# Patient Record
Sex: FEMALE | Race: BLACK OR AFRICAN AMERICAN | NOT HISPANIC OR LATINO | Employment: FULL TIME | URBAN - METROPOLITAN AREA
[De-identification: names, ages, dates, MRNs, and addresses within clinical notes are randomized per-mention and may not be internally consistent; named-entity substitution may affect disease eponyms.]

---

## 2017-02-13 ENCOUNTER — HOSPITAL ENCOUNTER (EMERGENCY)
Facility: MEDICAL CENTER | Age: 27
End: 2017-02-13
Attending: EMERGENCY MEDICINE
Payer: MEDICAID

## 2017-02-13 ENCOUNTER — APPOINTMENT (OUTPATIENT)
Dept: RADIOLOGY | Facility: MEDICAL CENTER | Age: 27
End: 2017-02-13
Attending: EMERGENCY MEDICINE
Payer: MEDICAID

## 2017-02-13 VITALS
BODY MASS INDEX: 26.1 KG/M2 | WEIGHT: 192.68 LBS | OXYGEN SATURATION: 98 % | HEART RATE: 67 BPM | HEIGHT: 72 IN | SYSTOLIC BLOOD PRESSURE: 123 MMHG | RESPIRATION RATE: 18 BRPM | DIASTOLIC BLOOD PRESSURE: 76 MMHG | TEMPERATURE: 98.4 F

## 2017-02-13 DIAGNOSIS — F41.8 SITUATIONAL ANXIETY: ICD-10-CM

## 2017-02-13 DIAGNOSIS — G89.29 CHRONIC ABDOMINAL PAIN: ICD-10-CM

## 2017-02-13 DIAGNOSIS — R10.31 RIGHT LOWER QUADRANT ABDOMINAL PAIN: ICD-10-CM

## 2017-02-13 DIAGNOSIS — N83.201 CYST OF RIGHT OVARY: ICD-10-CM

## 2017-02-13 DIAGNOSIS — R10.9 CHRONIC ABDOMINAL PAIN: ICD-10-CM

## 2017-02-13 LAB
ALBUMIN SERPL BCP-MCNC: 4.1 G/DL (ref 3.2–4.9)
ALBUMIN/GLOB SERPL: 1.8 G/DL
ALP SERPL-CCNC: 56 U/L (ref 30–99)
ALT SERPL-CCNC: 13 U/L (ref 2–50)
ANION GAP SERPL CALC-SCNC: 7 MMOL/L (ref 0–11.9)
APPEARANCE UR: ABNORMAL
AST SERPL-CCNC: 15 U/L (ref 12–45)
BACTERIA #/AREA URNS HPF: ABNORMAL /HPF
BILIRUB SERPL-MCNC: 0.5 MG/DL (ref 0.1–1.5)
BILIRUB UR QL STRIP.AUTO: NEGATIVE
BUN SERPL-MCNC: 17 MG/DL (ref 8–22)
CALCIUM SERPL-MCNC: 9.1 MG/DL (ref 8.4–10.2)
CHLORIDE SERPL-SCNC: 106 MMOL/L (ref 96–112)
CO2 SERPL-SCNC: 26 MMOL/L (ref 20–33)
COLOR UR: YELLOW
CREAT SERPL-MCNC: 0.86 MG/DL (ref 0.5–1.4)
CULTURE IF INDICATED INDCX: YES UA CULTURE
EPI CELLS #/AREA URNS HPF: ABNORMAL /HPF
ERYTHROCYTE [DISTWIDTH] IN BLOOD BY AUTOMATED COUNT: 42.7 FL (ref 35.9–50)
GFR SERPL CREATININE-BSD FRML MDRD: >60 ML/MIN/1.73 M 2
GLOBULIN SER CALC-MCNC: 2.3 G/DL (ref 1.9–3.5)
GLUCOSE SERPL-MCNC: 88 MG/DL (ref 65–99)
GLUCOSE UR STRIP.AUTO-MCNC: NEGATIVE MG/DL
HCG UR QL: NEGATIVE
HCT VFR BLD AUTO: 41.6 % (ref 37–47)
HGB BLD-MCNC: 13.6 G/DL (ref 12–16)
KETONES UR STRIP.AUTO-MCNC: 15 MG/DL
LEUKOCYTE ESTERASE UR QL STRIP.AUTO: NEGATIVE
MCH RBC QN AUTO: 28.3 PG (ref 27–33)
MCHC RBC AUTO-ENTMCNC: 32.7 G/DL (ref 33.6–35)
MCV RBC AUTO: 86.5 FL (ref 81.4–97.8)
MICRO URNS: ABNORMAL
MUCOUS THREADS #/AREA URNS HPF: ABNORMAL /HPF
NITRITE UR QL STRIP.AUTO: NEGATIVE
PH UR STRIP.AUTO: 6 [PH]
PLATELET # BLD AUTO: 220 K/UL (ref 164–446)
PMV BLD AUTO: 10.7 FL (ref 9–12.9)
POTASSIUM SERPL-SCNC: 4 MMOL/L (ref 3.6–5.5)
PROT SERPL-MCNC: 6.4 G/DL (ref 6–8.2)
PROT UR QL STRIP: NEGATIVE MG/DL
RBC # BLD AUTO: 4.81 M/UL (ref 4.2–5.4)
RBC # URNS HPF: ABNORMAL /HPF
RBC UR QL AUTO: NEGATIVE
SODIUM SERPL-SCNC: 139 MMOL/L (ref 135–145)
SP GR UR REFRACTOMETRY: 1.03
SP GR UR REFRACTOMETRY: 1.03
WBC # BLD AUTO: 6.8 K/UL (ref 4.8–10.8)
WBC #/AREA URNS HPF: ABNORMAL /HPF

## 2017-02-13 PROCEDURE — 36415 COLL VENOUS BLD VENIPUNCTURE: CPT

## 2017-02-13 PROCEDURE — 99284 EMERGENCY DEPT VISIT MOD MDM: CPT

## 2017-02-13 PROCEDURE — 700117 HCHG RX CONTRAST REV CODE 255: Performed by: EMERGENCY MEDICINE

## 2017-02-13 PROCEDURE — 81001 URINALYSIS AUTO W/SCOPE: CPT

## 2017-02-13 PROCEDURE — 700111 HCHG RX REV CODE 636 W/ 250 OVERRIDE (IP): Performed by: EMERGENCY MEDICINE

## 2017-02-13 PROCEDURE — A9270 NON-COVERED ITEM OR SERVICE: HCPCS | Performed by: EMERGENCY MEDICINE

## 2017-02-13 PROCEDURE — 85027 COMPLETE CBC AUTOMATED: CPT

## 2017-02-13 PROCEDURE — 87086 URINE CULTURE/COLONY COUNT: CPT

## 2017-02-13 PROCEDURE — 74177 CT ABD & PELVIS W/CONTRAST: CPT

## 2017-02-13 PROCEDURE — 81025 URINE PREGNANCY TEST: CPT

## 2017-02-13 PROCEDURE — 80053 COMPREHEN METABOLIC PANEL: CPT

## 2017-02-13 PROCEDURE — 700102 HCHG RX REV CODE 250 W/ 637 OVERRIDE(OP): Performed by: EMERGENCY MEDICINE

## 2017-02-13 RX ORDER — NITROFURANTOIN 25; 75 MG/1; MG/1
100 CAPSULE ORAL 2 TIMES DAILY
Qty: 20 CAP | Refills: 0 | Status: SHIPPED | OUTPATIENT
Start: 2017-02-13 | End: 2017-02-20

## 2017-02-13 RX ORDER — OXYCODONE HYDROCHLORIDE AND ACETAMINOPHEN 5; 325 MG/1; MG/1
1-2 TABLET ORAL EVERY 6 HOURS PRN
Qty: 12 TAB | Refills: 0 | Status: ON HOLD | OUTPATIENT
Start: 2017-02-13 | End: 2018-05-17

## 2017-02-13 RX ORDER — ONDANSETRON 4 MG/1
4 TABLET, ORALLY DISINTEGRATING ORAL ONCE
Status: COMPLETED | OUTPATIENT
Start: 2017-02-13 | End: 2017-02-13

## 2017-02-13 RX ORDER — OXYCODONE HYDROCHLORIDE AND ACETAMINOPHEN 5; 325 MG/1; MG/1
1 TABLET ORAL ONCE
Status: COMPLETED | OUTPATIENT
Start: 2017-02-13 | End: 2017-02-13

## 2017-02-13 RX ADMIN — OXYCODONE HYDROCHLORIDE AND ACETAMINOPHEN 1 TABLET: 5; 325 TABLET ORAL at 22:18

## 2017-02-13 RX ADMIN — ONDANSETRON 4 MG: 4 TABLET, ORALLY DISINTEGRATING ORAL at 22:18

## 2017-02-13 RX ADMIN — IOHEXOL 100 ML: 350 INJECTION, SOLUTION INTRAVENOUS at 21:59

## 2017-02-13 ASSESSMENT — PAIN SCALES - GENERAL
PAINLEVEL_OUTOF10: 6
PAINLEVEL_OUTOF10: 7

## 2017-02-13 NOTE — ED AVS SNAPSHOT
2/13/2017          Jewell Chapman  [address]  [place] [address]    Dear Jewell:    [facility] wants to ensure your discharge home is safe and you or your loved ones have had all your questions answered regarding your care after you leave the hospital.    You may receive a telephone call within two days of your discharge.  This call is to make certain you understand your discharge instructions as well as ensure we provided you with the best care possible during your stay with us.     The call will only last approximately 3-5 minutes and will be done by a nurse.    Once again, we want to ensure your discharge home is safe and that you have a clear understanding of any next steps in your care.  If you have any questions or concerns, please do not hesitate to contact us, we are here for you.  Thank you for choosing [facility] for your healthcare needs.    Sincerely,    Jay Jo    [facility]

## 2017-02-13 NOTE — ED AVS SNAPSHOT
Boni Access Code: [email or web address]  Expires: 3/15/2017 10:48 PM    Boni  A secure, online tool to manage your health information     [employer] Boni® is a secure, online tool that connects you to your personalized health information from the privacy of your home -- day or night - making it very easy for you to manage your healthcare. Once the activation process is completed, you can even access your medical information using the Boni susi, which is available for free in the Apple Susi store or Google Play store.     Boni provides the following levels of access (as shown below):   My Chart Features   [facility] Primary Care Doctor [facility]  Specialists [facility]  Urgent  Care Non-[facility]  Primary Care  Doctor   Email your healthcare team securely and privately 24/7 X X X X   Manage appointments: schedule your next appointment; view details of past/upcoming appointments X      Request prescription refills. X      View recent personal medical records, including lab and immunizations X X X X   View health record, including health history, allergies, medications X X X X   Read reports about your outpatient visits, procedures, consult and ER notes X X X X   See your discharge summary, which is a recap of your hospital and/or ER visit that includes your diagnosis, lab results, and care plan. X X       How to register for Boni:  1. Go to  [email or web address].  2. Click on the Sign Up Now box, which takes you to the New Member Sign Up page. You will need to provide the following information:  a. Enter your Boni Access Code exactly as it appears at the top of this page. (You will not need to use this code after you’ve completed the sign-up process. If you do not sign up before the expiration date, you must request a new code.)   b. Enter your date of birth.   c. Enter your home email address.   d. Click Submit, and follow the next screen’s instructions.  3. Create a Boni ID. This will be your Boni  login ID and cannot be changed, so think of one that is secure and easy to remember.  4. Create a [employer] password. You can change your password at any time.  5. Enter your Password Reset Question and Answer. This can be used at a later time if you forget your password.   6. Enter your e-mail address. This allows you to receive e-mail notifications when new information is available in [employer].  7. Click Sign Up. You can now view your health information.    For assistance activating your [employer] account, call [phone]

## 2017-02-13 NOTE — ED AVS SNAPSHOT
Home Care Instructions                                                                                                                Jewell Chapman   MRN: [id number]    Department:  [facility], Emergency Dept   Date of Visit:  2/13/2017            [facility], Emergency Dept    [address]    [place] [address]    Phone:  [phone]      You were seen by     Joe Nava M.D.      Your Diagnosis Was     Right lower quadrant abdominal pain     R10.31       These are the medications you received during your hospitalization from 02/13/2017 1728 to 02/13/2017 2248     Date/Time Order Dose Route Action    02/13/2017 2159 iohexol (OMNIPAQUE) 350 mg/mL 100 mL Intravenous Given    02/13/2017 2218 oxycodone-acetaminophen (PERCOCET) 5-325 MG per tablet 1 Tab 1 Tab Oral Given    02/13/2017 2218 ondansetron (ZOFRAN ODT) dispertab 4 mg 4 mg Oral Given      Follow-up Information     1. Follow up with Your Physician. Schedule an appointment as soon as possible for a visit in 2 days.    Specialty:  Emergency Medicine    Contact information    Varies        Medication Information     Review all of your home medications and newly ordered medications with your primary doctor and/or pharmacist as soon as possible. Follow medication instructions as directed by your doctor and/or pharmacist.     Please keep your complete medication list with you and share with your physician. Update the information when medications are discontinued, doses are changed, or new medications (including over-the-counter products) are added; and carry medication information at all times in the event of emergency situations.               Medication List      START taking these medications        Instructions    nitrofurantoin monohydr macro 100 MG Caps   Commonly known as:  MACROBID    Take 1 Cap by mouth 2 times a day for 7 days.   Dose:  100 mg       oxycodone-acetaminophen 5-325 MG Tabs      Commonly known as:  PERCOCET    Take 1-2 Tabs by mouth every 6 hours as needed (pain).   Dose:  1-2 Tab         ASK your doctor about these medications        Instructions    albuterol 2.5 mg/0.5 mL Nebu   Commonly known as:  PROVENTIL    by Nebulization route ONCE (RT).               Procedures and tests performed during your visit     Procedure/Test Number of Times Performed    BETA-HCG QUALITATIVE URINE 1    CBC WITHOUT DIFFERENTIAL 1    COMP METABOLIC PANEL 1    CONSENT FOR CONTRAST INJECTION 1    CT-ABDOMEN-PELVIS WITH 1    ESTIMATED GFR 1    REFRACTOMETER SG 2    URINALYSIS,CULTURE IF INDICATED 1    URINE CULTURE(NEW) 1    URINE MICROSCOPIC (W/UA) 1        Discharge Instructions       Follow-up with her doctor.  ER for more pain, if you develop fever, vomiting or other concerns.  No driving on pain medicines.  Drink plenty of fluid.    Abdominal Pain, Adult  Many things can cause belly (abdominal) pain. Most times, the belly pain is not dangerous. Many cases of belly pain can be watched and treated at home.  HOME CARE   · Do not take medicines that help you go poop (laxatives) unless told to by your doctor.  · Only take medicine as told by your doctor.  · Eat or drink as told by your doctor. Your doctor will tell you if you should be on a special diet.  GET HELP IF:  · You do not know what is causing your belly pain.  · You have belly pain while you are sick to your stomach (nauseous) or have runny poop (diarrhea).  · You have pain while you pee or poop.  · Your belly pain wakes you up at night.  · You have belly pain that gets worse or better when you eat.  · You have belly pain that gets worse when you eat fatty foods.  · You have a fever.  GET HELP RIGHT AWAY IF:   · The pain does not go away within 2 hours.  · You keep throwing up (vomiting).  · The pain changes and is only in the right or left part of the belly.  · You have bloody or tarry looking poop.  MAKE SURE YOU:   · Understand these  "instructions.  · Will watch your condition.  · Will get help right away if you are not doing well or get worse.     This information is not intended to replace advice given to you by your health care provider. Make sure you discuss any questions you have with your health care provider.    Ovarian Cyst  An ovarian cyst is a fluid-filled sac that forms on an ovary. The ovaries are small organs that produce eggs in women. Various types of cysts can form on the ovaries. Most are not cancerous. Many do not cause problems, and they often go away on their own. Some may cause symptoms and require treatment. Common types of ovarian cysts include:  · Functional cysts--These cysts may occur every month during the menstrual cycle. This is normal. The cysts usually go away with the next menstrual cycle if the woman does not get pregnant. Usually, there are no symptoms with a functional cyst.  · Endometrioma cysts--These cysts form from the tissue that lines the uterus. They are also called \"chocolate cysts\" because they become filled with blood that turns brown. This type of cyst can cause pain in the lower abdomen during intercourse and with your menstrual period.  · Cystadenoma cysts--This type develops from the cells on the outside of the ovary. These cysts can get very big and cause lower abdomen pain and pain with intercourse. This type of cyst can twist on itself, cut off its blood supply, and cause severe pain. It can also easily rupture and cause a lot of pain.  · Dermoid cysts--This type of cyst is sometimes found in both ovaries. These cysts may contain different kinds of body tissue, such as skin, teeth, hair, or cartilage. They usually do not cause symptoms unless they get very big.  · Theca lutein cysts--These cysts occur when too much of a certain hormone (human chorionic gonadotropin) is produced and overstimulates the ovaries to produce an egg. This is most common after procedures used to assist with the " conception of a baby (in vitro fertilization).  CAUSES   · Fertility drugs can cause a condition in which multiple large cysts are formed on the ovaries. This is called ovarian hyperstimulation syndrome.  · A condition called polycystic ovary syndrome can cause hormonal imbalances that can lead to nonfunctional ovarian cysts.  SIGNS AND SYMPTOMS   Many ovarian cysts do not cause symptoms. If symptoms are present, they may include:  · Pelvic pain or pressure.  · Pain in the lower abdomen.  · Pain during sexual intercourse.  · Increasing girth (swelling) of the abdomen.  · Abnormal menstrual periods.  · Increasing pain with menstrual periods.  · Stopping having menstrual periods without being pregnant.  DIAGNOSIS   These cysts are commonly found during a routine or annual pelvic exam. Tests may be ordered to find out more about the cyst. These tests may include:  · Ultrasound.  · X-ray of the pelvis.  · CT scan.  · MRI.  · Blood tests.  TREATMENT   Many ovarian cysts go away on their own without treatment. Your health care provider may want to check your cyst regularly for 2-3 months to see if it changes. For women in menopause, it is particularly important to monitor a cyst closely because of the higher rate of ovarian cancer in menopausal women. When treatment is needed, it may include any of the following:  · A procedure to drain the cyst (aspiration). This may be done using a long needle and ultrasound. It can also be done through a laparoscopic procedure. This involves using a thin, lighted tube with a tiny camera on the end (laparoscope) inserted through a small incision.  · Surgery to remove the whole cyst. This may be done using laparoscopic surgery or an open surgery involving a larger incision in the lower abdomen.  · Hormone treatment or birth control pills. These methods are sometimes used to help dissolve a cyst.  HOME CARE INSTRUCTIONS   · Only take over-the-counter or prescription medicines as directed  by your health care provider.  · Follow up with your health care provider as directed.  · Get regular pelvic exams and Pap tests.  SEEK MEDICAL CARE IF:   · Your periods are late, irregular, or painful, or they stop.  · Your pelvic pain or abdominal pain does not go away.  · Your abdomen becomes larger or swollen.  · You have pressure on your bladder or trouble emptying your bladder completely.  · You have pain during sexual intercourse.  · You have feelings of fullness, pressure, or discomfort in your stomach.  · You lose weight for no apparent reason.  · You feel generally ill.  · You become constipated.  · You lose your appetite.  · You develop acne.  · You have an increase in body and facial hair.  · You are gaining weight, without changing your exercise and eating habits.  · You think you are pregnant.  SEEK IMMEDIATE MEDICAL CARE IF:   · You have increasing abdominal pain.  · You feel sick to your stomach (nauseous), and you throw up (vomit).  · You develop a fever that comes on suddenly.  · You have abdominal pain during a bowel movement.  · Your menstrual periods become heavier than usual.  MAKE SURE YOU:  · Understand these instructions.  · Will watch your condition.  · Will get help right away if you are not doing well or get worse.     This information is not intended to replace advice given to you by your health care provider. Make sure you discuss any questions you have with your health care provider.     Document Released: 12/18/2006 Document Revised: 12/23/2014 Document Reviewed: 08/25/2014  [employer] Patient Education ©2016 [employer]                Patient Information     Patient Information    Following emergency treatment: all patient requiring follow-up care must return either to a private physician or a clinic if your condition worsens before you are able to obtain further medical attention, please return to the emergency room.     Billing Information    At [employer], we work  to make the billing process streamlined for our patients.  Our Representatives are here to answer any questions you may have regarding your hospital bill.  If you have insurance coverage and have supplied your insurance information to us, we will submit a claim to your insurer on your behalf.  Should you have any questions regarding your bill, we can be reached online or by phone as follows:  Online: You are able pay your bills online or live chat with our representatives about any billing questions you may have. We are here to help Monday - Friday from 8:00am to 7:30pm and 9:00am - 12:00pm on Saturdays.  Please visit [email or web address]  for more information.   Phone:  [phone] or [phone]    Please note that your emergency physician, surgeon, pathologist, radiologist, anesthesiologist, and other specialists are not employed by [facility] and will therefore bill separately for their services.  Please contact them directly for any questions concerning their bills at the numbers below:     Emergency Physician Services:  [phone]  [facility]:  [phone]  [facility]:  [phone]  [facility]:  [phone]    1. Your final bill may vary from the amount quoted upon discharge if all procedures are not complete at that time, or if your doctor has additional procedures of which we are not aware. You will receive an additional bill if you return to the Emergency Department at [facility] for suture removal regardless of the facility of which the sutures were placed.     2. Please arrange for settlement of this account at the emergency registration.    3. All self-pay accounts are due in full at the time of treatment.  If you are unable to meet this obligation then payment is expected within 4-5 days.     4. If you have had radiology studies (CT, X-ray, Ultrasound, MRI), you have received a preliminary result during your  emergency department visit. Please contact the radiology department [phone] to receive a copy of your final result. Please discuss the Final result with your primary physician or with the follow up physician provided.     Crisis Hotline:  [place] Crisis Hotline:  7-533-EMJAUKO or 1-464.546.2438  [place] Crisis Hotline:    1-108.110.3588 or 310-664-7572         ED Discharge Follow Up Questions    1. In order to provide you with very good care, we would like to follow up with a phone call in the next few days.  May we have your permission to contact you?     YES /  NO    2. What is the best phone number to call you? (       )_____-__________    3. What is the best time to call you?      Morning  /  Afternoon  /  Evening                   Patient Signature:  ____________________________________________________________    Date:  ____________________________________________________________

## 2017-02-14 NOTE — ED PROVIDER NOTES
ED Provider Note    CHIEF COMPLAINT  Chief Complaint   Patient presents with   • RLQ Pain   • Urinary Pain       HPI  Jewell Chapman is a 26 y.o. female who presents to the ER complaining of right lower quadrant abdominal pain.  This patient presents for several months.  His worsened over the last week.  It is the right lower quadrant, does not radiate.  Worse in the morning when she 1st worked up and worsens when she has to pee.  It worsens when she denies.  She denies any increased frequency.  Denies possible pregnancy.  Patient had a hysterectomy.  She still has her right ovary does not have her left ovary.  Denies any abnormal vaginal bleeding, spotting or discharge.  No constipation or diarrhea.  Nothing makes it better, nothing makes it worse.  No other acute concerns or complaints.    REVIEW OF SYSTEMS  See HPI for further details. All other systems are negative.    PAST MEDICAL HISTORY  Past Medical History   Diagnosis Date   • Heartburn    • Hernia of unspecified site of abdominal cavity without mention of obstruction or gangrene    • Umbilical hernia Dx 5/2009   • Prenatal hydronephrosis in pregnancy, antepartum condition 4/5/2010   • Hernia of unspecified site of abdominal cavity without mention of obstruction or gangrene      abd   • Anxiety    • BV (bacterial vaginosis) 4/5/2010   • HPV (human papilloma virus) anogenital infection    • Pregnancy    • Endometriosis    • Hypoglycemia        FAMILY HISTORY  Family History   Problem Relation Age of Onset   • Alcohol/Drug Father      ETOH and Drug abuse   • Psychiatry Father      Schizophrenic, bipolar, depression   • Psychiatry Sister      none Dx'd   • Psychiatry Brother      none Dx'd   • Alcohol/Drug Maternal Grandmother      alcoholic   • Alcohol/Drug Paternal Grandmother      Drug abuse       SOCIAL HISTORY  Social History     Social History   • Marital Status: Single     Spouse Name: N/A   • Number of Children: N/A   • Years of Education: N/A      Social History Main Topics   • Smoking status: Current Every Day Smoker -- 0.50 packs/day for 9 years     Types: Cigarettes   • Smokeless tobacco: Never Used      Comment: 1/4 pack per day   • Alcohol Use: Yes      Comment: occasionally   • Drug Use: No   • Sexual Activity:     Partners: Male     Birth Control/ Protection: Pill     Other Topics Concern   • None     Social History Narrative       SURGICAL HISTORY  Past Surgical History   Procedure Laterality Date   • Other abdominal surgery       egd scope   • Pelviscopy  4/18/2013     Performed by Mal Horne M.D. at SURGERY SAME DAY [facility]   • Ovarian cystectomy  4/18/2013     Performed by Mal Horne M.D. at SURGERY SAME DAY [facility]   • Tubal coagulation laparoscopic bilateral  4/18/2013     Performed by Mal Horne M.D. at SURGERY SAME DAY [facility]   • Hysterectomy robotic         CURRENT MEDICATIONS  Home Medications     Reviewed by Ha Pak R.N. (Registered Nurse) on 02/13/17 at 1759  Med List Status: Complete    Medication Last Dose Status    albuterol (PROVENTIL) 2.5 mg/0.5 mL Nebu Soln 2/13/2017 Active                ALLERGIES  Allergies   Allergen Reactions   • Imitrex [Sumatriptan Succinate] Anaphylaxis   • Vicodin [Hydrocodone-Acetaminophen] Hives and Itching       PHYSICAL EXAM  VITAL SIGNS: /76 mmHg  Pulse 94  Temp(Src) 36.9 °C (98.4 °F)  Resp 18  Ht 1.829 m (6')  Wt 87.4 kg (192 lb 10.9 oz)  BMI 26.13 kg/m2  SpO2 98%  LMP 08/13/2009     Constitutional: Well developed, Well nourished, No acute distress, Non-toxic appearance.   HENT: Normocephalic, Atraumatic, Bilateral external ears normal, Oropharynx moist, No oral exudates, Nose normal.   Eyes: PERRL, EOMI, Conjunctiva normal, No discharge.   Neck: Normal range of motion, No tenderness, Supple, No stridor.   Lymphatic: No lymphadenopathy noted.   Cardiovascular: Normal heart rate, Normal rhythm, No murmurs, No rubs, No gallops.   Thorax & Lungs: Normal  breath sounds, No respiratory distress, No wheezing,   Abdomen: Bowel sounds normal, Soft, tenderness the right lower quadrant.  No peritonitis  Skin: Warm, Dry, No erythema, No rash.   Back: No tenderness, No CVA tenderness.   Musculoskeletal: Good range of motion in all major joints.   Neurologic: Alert & oriented x 3, No focal deficits noted.   Psychiatric: Affect normal,        RADIOLOGY/PROCEDURES  CT-ABDOMEN-PELVIS WITH   Final Result      1.  No acute abdominal or pelvic findings.   2.  Probable small right ovarian cyst.            COURSE & MEDICAL DECISION MAKING  Pertinent Labs & Imaging studies reviewed. (See chart for details)  Patient presents to the ER with right lower quadrant which is been going on for a long period of time.  Skin in and out of the ER for right lower quadrant pain on multiple occasions, has been worked up.  She's had some, located abdominal hernias and has required surgery in the past.  She denies any gynecologic complaints or urinary complaints.  She has no vaginal bleeding, spotting or discharge.  She denies nausea, vomiting, diarrhea.  Although she has some tenderness of the right lower quadrant.  She has no peritonitis, rebound.  She has no fever, leukocytosis or other laboratory abnormalities.  Her old chart is reviewed.  She's had multiple CAT scans in the past, multiple workups.  I don't see any evidence of appendicitis.  DT does show small ovarian cysts and this may be in fact causing her pain.  Is not large enough to justify doing an ultrasound.  The patient with her a few days of Macrobid is of urinary frequency and dysuria.    The patient's discomfort .  We'll prescribe her a short course of pain medicines.  She is referred back to her doctor.    Prior to the prescription of narcotics.  Her  is reviewed.    The patient is agreeable to plan.  She'll follow-up with her doctor.  She is discharged in good condition      FINAL IMPRESSION  1. Right lower quadrant abdominal  pain    2. Chronic abdominal pain    3. Situational anxiety        2.   3.         Electronically signed by: Joe Nava, 2/13/2017 9:37 PM

## 2017-02-14 NOTE — ED NOTES
Dc instructions and prescriptions given. Pt to f/u with pcp, return for worsening s/s, aware of not being able to drive due to meds recvd in er

## 2017-02-14 NOTE — DISCHARGE INSTRUCTIONS
Follow-up with her doctor.  ER for more pain, if you develop fever, vomiting or other concerns.  No driving on pain medicines.  Drink plenty of fluid.    Abdominal Pain, Adult  Many things can cause belly (abdominal) pain. Most times, the belly pain is not dangerous. Many cases of belly pain can be watched and treated at home.  HOME CARE   · Do not take medicines that help you go poop (laxatives) unless told to by your doctor.  · Only take medicine as told by your doctor.  · Eat or drink as told by your doctor. Your doctor will tell you if you should be on a special diet.  GET HELP IF:  · You do not know what is causing your belly pain.  · You have belly pain while you are sick to your stomach (nauseous) or have runny poop (diarrhea).  · You have pain while you pee or poop.  · Your belly pain wakes you up at night.  · You have belly pain that gets worse or better when you eat.  · You have belly pain that gets worse when you eat fatty foods.  · You have a fever.  GET HELP RIGHT AWAY IF:   · The pain does not go away within 2 hours.  · You keep throwing up (vomiting).  · The pain changes and is only in the right or left part of the belly.  · You have bloody or tarry looking poop.  MAKE SURE YOU:   · Understand these instructions.  · Will watch your condition.  · Will get help right away if you are not doing well or get worse.     This information is not intended to replace advice given to you by your health care provider. Make sure you discuss any questions you have with your health care provider.    Ovarian Cyst  An ovarian cyst is a fluid-filled sac that forms on an ovary. The ovaries are small organs that produce eggs in women. Various types of cysts can form on the ovaries. Most are not cancerous. Many do not cause problems, and they often go away on their own. Some may cause symptoms and require treatment. Common types of ovarian cysts include:  · Functional cysts--These cysts may occur every month during the  "menstrual cycle. This is normal. The cysts usually go away with the next menstrual cycle if the woman does not get pregnant. Usually, there are no symptoms with a functional cyst.  · Endometrioma cysts--These cysts form from the tissue that lines the uterus. They are also called \"chocolate cysts\" because they become filled with blood that turns brown. This type of cyst can cause pain in the lower abdomen during intercourse and with your menstrual period.  · Cystadenoma cysts--This type develops from the cells on the outside of the ovary. These cysts can get very big and cause lower abdomen pain and pain with intercourse. This type of cyst can twist on itself, cut off its blood supply, and cause severe pain. It can also easily rupture and cause a lot of pain.  · Dermoid cysts--This type of cyst is sometimes found in both ovaries. These cysts may contain different kinds of body tissue, such as skin, teeth, hair, or cartilage. They usually do not cause symptoms unless they get very big.  · Theca lutein cysts--These cysts occur when too much of a certain hormone (human chorionic gonadotropin) is produced and overstimulates the ovaries to produce an egg. This is most common after procedures used to assist with the conception of a baby (in vitro fertilization).  CAUSES   · Fertility drugs can cause a condition in which multiple large cysts are formed on the ovaries. This is called ovarian hyperstimulation syndrome.  · A condition called polycystic ovary syndrome can cause hormonal imbalances that can lead to nonfunctional ovarian cysts.  SIGNS AND SYMPTOMS   Many ovarian cysts do not cause symptoms. If symptoms are present, they may include:  · Pelvic pain or pressure.  · Pain in the lower abdomen.  · Pain during sexual intercourse.  · Increasing girth (swelling) of the abdomen.  · Abnormal menstrual periods.  · Increasing pain with menstrual periods.  · Stopping having menstrual periods without being pregnant.  DIAGNOSIS "   These cysts are commonly found during a routine or annual pelvic exam. Tests may be ordered to find out more about the cyst. These tests may include:  · Ultrasound.  · X-ray of the pelvis.  · CT scan.  · MRI.  · Blood tests.  TREATMENT   Many ovarian cysts go away on their own without treatment. Your health care provider may want to check your cyst regularly for 2-3 months to see if it changes. For women in menopause, it is particularly important to monitor a cyst closely because of the higher rate of ovarian cancer in menopausal women. When treatment is needed, it may include any of the following:  · A procedure to drain the cyst (aspiration). This may be done using a long needle and ultrasound. It can also be done through a laparoscopic procedure. This involves using a thin, lighted tube with a tiny camera on the end (laparoscope) inserted through a small incision.  · Surgery to remove the whole cyst. This may be done using laparoscopic surgery or an open surgery involving a larger incision in the lower abdomen.  · Hormone treatment or birth control pills. These methods are sometimes used to help dissolve a cyst.  HOME CARE INSTRUCTIONS   · Only take over-the-counter or prescription medicines as directed by your health care provider.  · Follow up with your health care provider as directed.  · Get regular pelvic exams and Pap tests.  SEEK MEDICAL CARE IF:   · Your periods are late, irregular, or painful, or they stop.  · Your pelvic pain or abdominal pain does not go away.  · Your abdomen becomes larger or swollen.  · You have pressure on your bladder or trouble emptying your bladder completely.  · You have pain during sexual intercourse.  · You have feelings of fullness, pressure, or discomfort in your stomach.  · You lose weight for no apparent reason.  · You feel generally ill.  · You become constipated.  · You lose your appetite.  · You develop acne.  · You have an increase in body and facial hair.  · You are  gaining weight, without changing your exercise and eating habits.  · You think you are pregnant.  SEEK IMMEDIATE MEDICAL CARE IF:   · You have increasing abdominal pain.  · You feel sick to your stomach (nauseous), and you throw up (vomit).  · You develop a fever that comes on suddenly.  · You have abdominal pain during a bowel movement.  · Your menstrual periods become heavier than usual.  MAKE SURE YOU:  · Understand these instructions.  · Will watch your condition.  · Will get help right away if you are not doing well or get worse.     This information is not intended to replace advice given to you by your health care provider. Make sure you discuss any questions you have with your health care provider.     Document Released: 12/18/2006 Document Revised: 12/23/2014 Document Reviewed: 08/25/2014  Elsevier Interactive Patient Education ©2016 [employer]

## 2017-02-15 LAB
BACTERIA UR CULT: NORMAL
SIGNIFICANT IND 70042: NORMAL
SITE SITE: NORMAL
SOURCE SOURCE: NORMAL

## 2017-05-30 ENCOUNTER — HOSPITAL ENCOUNTER (EMERGENCY)
Dept: HOSPITAL 8 - ED | Age: 27
Discharge: HOME | End: 2017-05-30
Payer: SELF-PAY

## 2017-05-30 VITALS — WEIGHT: 192.02 LBS | HEIGHT: 72 IN | BODY MASS INDEX: 26.01 KG/M2

## 2017-05-30 VITALS — DIASTOLIC BLOOD PRESSURE: 84 MMHG | SYSTOLIC BLOOD PRESSURE: 131 MMHG

## 2017-05-30 DIAGNOSIS — H01.005: Primary | ICD-10-CM

## 2017-05-30 DIAGNOSIS — K21.9: ICD-10-CM

## 2017-05-30 DIAGNOSIS — B95.8: ICD-10-CM

## 2017-05-30 DIAGNOSIS — J45.909: ICD-10-CM

## 2017-05-30 DIAGNOSIS — Z90.710: ICD-10-CM

## 2017-05-30 DIAGNOSIS — F17.210: ICD-10-CM

## 2017-05-30 DIAGNOSIS — Z88.8: ICD-10-CM

## 2017-05-30 PROCEDURE — 99283 EMERGENCY DEPT VISIT LOW MDM: CPT

## 2017-08-06 ENCOUNTER — HOSPITAL ENCOUNTER (EMERGENCY)
Dept: HOSPITAL 8 - ED | Age: 27
Discharge: HOME | End: 2017-08-06
Payer: COMMERCIAL

## 2017-08-06 VITALS — HEIGHT: 72 IN | BODY MASS INDEX: 26.67 KG/M2 | WEIGHT: 196.87 LBS

## 2017-08-06 VITALS — SYSTOLIC BLOOD PRESSURE: 122 MMHG | DIASTOLIC BLOOD PRESSURE: 64 MMHG

## 2017-08-06 DIAGNOSIS — M54.6: ICD-10-CM

## 2017-08-06 DIAGNOSIS — N28.9: Primary | ICD-10-CM

## 2017-08-06 LAB
AST SERPL-CCNC: 13 U/L (ref 15–37)
BUN SERPL-MCNC: 17 MG/DL (ref 7–18)
PATH.CAST-FLAG: (no result)
SPERM-FLAG: (no result)
SRC-FLAG: (no result)
XTAL-FLAG: (no result)
YLC-FLAG: (no result)

## 2017-08-06 PROCEDURE — 80053 COMPREHEN METABOLIC PANEL: CPT

## 2017-08-06 PROCEDURE — 85025 COMPLETE CBC W/AUTO DIFF WBC: CPT

## 2017-08-06 PROCEDURE — 93005 ELECTROCARDIOGRAM TRACING: CPT

## 2017-08-06 PROCEDURE — 84703 CHORIONIC GONADOTROPIN ASSAY: CPT

## 2017-08-06 PROCEDURE — 81001 URINALYSIS AUTO W/SCOPE: CPT

## 2017-08-06 PROCEDURE — 96374 THER/PROPH/DIAG INJ IV PUSH: CPT

## 2017-08-06 PROCEDURE — 36415 COLL VENOUS BLD VENIPUNCTURE: CPT

## 2017-08-06 PROCEDURE — 99285 EMERGENCY DEPT VISIT HI MDM: CPT

## 2017-08-06 PROCEDURE — 96361 HYDRATE IV INFUSION ADD-ON: CPT

## 2017-08-06 PROCEDURE — 87086 URINE CULTURE/COLONY COUNT: CPT

## 2017-09-11 ENCOUNTER — HOSPITAL ENCOUNTER (EMERGENCY)
Dept: HOSPITAL 8 - ED | Age: 27
Discharge: HOME | End: 2017-09-11
Payer: SELF-PAY

## 2017-09-11 VITALS — SYSTOLIC BLOOD PRESSURE: 133 MMHG | DIASTOLIC BLOOD PRESSURE: 77 MMHG

## 2017-09-11 VITALS — WEIGHT: 194.01 LBS | HEIGHT: 72 IN | BODY MASS INDEX: 26.28 KG/M2

## 2017-09-11 DIAGNOSIS — K21.9: ICD-10-CM

## 2017-09-11 DIAGNOSIS — H10.023: Primary | ICD-10-CM

## 2017-09-11 DIAGNOSIS — J45.909: ICD-10-CM

## 2017-09-11 DIAGNOSIS — Z90.710: ICD-10-CM

## 2017-09-11 PROCEDURE — 99283 EMERGENCY DEPT VISIT LOW MDM: CPT

## 2017-09-14 ENCOUNTER — HOSPITAL ENCOUNTER (EMERGENCY)
Dept: HOSPITAL 8 - ED | Age: 27
Discharge: HOME | End: 2017-09-14
Payer: COMMERCIAL

## 2017-09-14 VITALS — BODY MASS INDEX: 27.11 KG/M2 | HEIGHT: 72 IN | WEIGHT: 200.18 LBS

## 2017-09-14 VITALS — SYSTOLIC BLOOD PRESSURE: 136 MMHG | DIASTOLIC BLOOD PRESSURE: 83 MMHG
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
## 2017-09-14 DIAGNOSIS — Z88.6: ICD-10-CM

## 2017-09-14 DIAGNOSIS — Z88.8: ICD-10-CM

## 2017-09-14 DIAGNOSIS — M79.642: ICD-10-CM

## 2017-09-14 DIAGNOSIS — S60.052A: Primary | ICD-10-CM

## 2017-09-14 PROCEDURE — 99284 EMERGENCY DEPT VISIT MOD MDM: CPT

## 2017-09-14 PROCEDURE — 29130 APPL FINGER SPLINT STATIC: CPT

## 2017-10-08 ENCOUNTER — HOSPITAL ENCOUNTER (EMERGENCY)
Dept: HOSPITAL 8 - ED | Age: 27
Discharge: HOME | End: 2017-10-08
Payer: SELF-PAY

## 2017-10-08 VITALS — BODY MASS INDEX: 27.47 KG/M2 | HEIGHT: 72 IN | WEIGHT: 202.83 LBS

## 2017-10-08 VITALS — DIASTOLIC BLOOD PRESSURE: 85 MMHG | SYSTOLIC BLOOD PRESSURE: 142 MMHG

## 2017-10-08 DIAGNOSIS — K21.9: ICD-10-CM

## 2017-10-08 DIAGNOSIS — S80.02XA: Primary | ICD-10-CM
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
## 2017-10-08 DIAGNOSIS — F17.200: ICD-10-CM
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
## 2017-10-08 DIAGNOSIS — Z88.5: ICD-10-CM

## 2017-10-08 DIAGNOSIS — J45.909: ICD-10-CM

## 2017-10-08 DIAGNOSIS — N80.9: ICD-10-CM

## 2017-10-08 DIAGNOSIS — Z88.8: ICD-10-CM

## 2017-10-08 PROCEDURE — 99284 EMERGENCY DEPT VISIT MOD MDM: CPT

## 2018-01-31 ENCOUNTER — NON-PROVIDER VISIT (OUTPATIENT)
Dept: OCCUPATIONAL MEDICINE | Facility: CLINIC | Age: 28
End: 2018-01-31

## 2018-01-31 DIAGNOSIS — Z11.1 ENCOUNTER FOR PPD TEST: Primary | ICD-10-CM

## 2018-01-31 PROCEDURE — 86580 TB INTRADERMAL TEST: CPT | Performed by: PREVENTIVE MEDICINE

## 2018-02-01 ENCOUNTER — HOSPITAL ENCOUNTER (EMERGENCY)
Dept: HOSPITAL 8 - ED | Age: 28
Discharge: HOME | End: 2018-02-01
Payer: MEDICAID

## 2018-02-01 VITALS — DIASTOLIC BLOOD PRESSURE: 80 MMHG | SYSTOLIC BLOOD PRESSURE: 120 MMHG

## 2018-02-01 VITALS — BODY MASS INDEX: 27.83 KG/M2 | HEIGHT: 72 IN | WEIGHT: 205.47 LBS

## 2018-02-01 DIAGNOSIS — R07.89: Primary | ICD-10-CM

## 2018-02-01 DIAGNOSIS — M94.0: ICD-10-CM

## 2018-02-01 LAB
ALBUMIN SERPL-MCNC: 3.3 G/DL (ref 3.4–5)
ANION GAP SERPL CALC-SCNC: 7 MMOL/L (ref 5–15)
CALCIUM SERPL-MCNC: 8.2 MG/DL (ref 8.5–10.1)
CHLORIDE SERPL-SCNC: 111 MMOL/L (ref 98–107)
CREAT SERPL-MCNC: 0.76 MG/DL (ref 0.55–1.02)

## 2018-02-01 PROCEDURE — 80048 BASIC METABOLIC PNL TOTAL CA: CPT

## 2018-02-01 PROCEDURE — 82040 ASSAY OF SERUM ALBUMIN: CPT

## 2018-02-01 PROCEDURE — 99285 EMERGENCY DEPT VISIT HI MDM: CPT

## 2018-02-01 PROCEDURE — 36415 COLL VENOUS BLD VENIPUNCTURE: CPT

## 2018-02-01 PROCEDURE — 85379 FIBRIN DEGRADATION QUANT: CPT

## 2018-02-01 PROCEDURE — 93005 ELECTROCARDIOGRAM TRACING: CPT

## 2018-02-01 PROCEDURE — 96372 THER/PROPH/DIAG INJ SC/IM: CPT

## 2018-02-01 PROCEDURE — 71045 X-RAY EXAM CHEST 1 VIEW: CPT

## 2018-02-02 ENCOUNTER — NON-PROVIDER VISIT (OUTPATIENT)
Dept: OCCUPATIONAL MEDICINE | Facility: CLINIC | Age: 28
End: 2018-02-02

## 2018-02-02 DIAGNOSIS — Z11.1 ENCOUNTER FOR PPD SKIN TEST READING: ICD-10-CM

## 2018-02-02 LAB — TB WHEAL 3D P 5 TU DIAM: NORMAL MM

## 2018-05-11 NOTE — H&P
DATE OF SCHEDULED SURGERY:  2018    REASON FOR SURGERY:  Symptomatic pelvic floor relaxation, pelvic pain, type 2   stress urinary incontinence as demonstrated by urodynamic studies.    HISTORY OF PRESENT ILLNESS:  This is a 27-year-old  4, para 3,   spontaneous  1, status post previous hysterectomy for refractory   endometriosis and pelvic pain, now with pelvic floor relaxation with large   bulge at the vaginal introitus and stress urinary incontinence associated with   significant dyspareunia.  She states that she is wishing to proceed forward   with attempted definitive surgical correction with an anterior and posterior   vaginal repair, enterocele repair, perineoplasty, sacrospinous vault   suspension in addition to transobturator tape midurethral sling procedure.    Risks, benefits, alternatives of all individual portions of the surgery were   addressed with the patient in detail over several visits.  She has asked   appropriate questions, signed the appropriate consents and is wishing to   proceed forward with surgery as planned.  Of note, she does state that she   understands the limitations of surgery and addressing pelvic pain in addition   to dyspareunia, these symptoms may be improved or actually worsened with the   surgery as described above and she is interested in proceeding forward with   surgery nonetheless.  Given understanding the limitations of surgery.    PAST MEDICAL HISTORY:  As stated above, endometriosis, asthma,   nephrolithiasis, hernia.    PAST SURGICAL HISTORY:  Hysterectomy and pelvic floor repair in .    OBSTETRICAL HISTORY:  The patient had 3 previous deliveries, 1 spontaneous   .    GYNECOLOGIC HISTORY:  Patient has been amenorrheic since her previous surgery.    She does report a large bulge at the vaginal introitus, pelvic pain,   dyspareunia, stress urinary incontinence requiring intermittent pad therapy.    She denies any previous sexually  transmitted diseases or pelvic infection.    SOCIAL HISTORY:  She is single.  She denies the use of any alcohol.  She   reports a 10-year history of tobacco use, currently smoking a quarter of a   pack of cigarettes per day.  She denies any other drug use.    MEDICATIONS:  None.    ALLERGIES:  IMITREX AND VICODIN.    PHYSICAL EXAMINATION:  VITAL SIGNS:  She is afebrile, hemodynamically stable.  HEART:  Regular rate and rhythm.  CHEST:  Clear to auscultation bilaterally.  ABDOMEN:  Soft, nondistended, nontender.  Bowel sounds were present.  PELVIC:  Exam  shows normal external female genitalia.  Grade II anterior,   posterior and apical vaginal relaxation.  Bimanual exam shows no adnexal mass   or tenderness to palpation were appreciated.  EXTREMITIES:  Nontender.  Urodynamic studies did demonstrate and confirmed   type 2 stress urinary incontinence.  The patient did have a negative urine   pregnancy test.    ASSESSMENT AND PLAN:  A 27-year-old woman with symptomatic pelvic floor   relaxation, pelvic pain, dyspareunia, type 2 stress urinary incontinence, now   interested in proceeding forward with surgery as described above,   understanding limitations of surgery.  She has asked appropriate questions,   signed the appropriate consents, and is wishing to proceed forward with   surgery as planned.       ____________________________________     MD LORETTA KING / JEAN-PIERRE    DD:  05/11/2018 08:20:07  DT:  05/11/2018 08:44:44    D#:  [id number]  Job#:  [id number]

## 2018-05-17 ENCOUNTER — HOSPITAL ENCOUNTER (OUTPATIENT)
Facility: MEDICAL CENTER | Age: 28
End: 2018-05-17
Attending: SPECIALIST | Admitting: SPECIALIST
Payer: MEDICAID

## 2018-05-17 VITALS
BODY MASS INDEX: 29.47 KG/M2 | WEIGHT: 217.59 LBS | TEMPERATURE: 97.6 F | RESPIRATION RATE: 16 BRPM | DIASTOLIC BLOOD PRESSURE: 72 MMHG | HEART RATE: 70 BPM | SYSTOLIC BLOOD PRESSURE: 110 MMHG | HEIGHT: 72 IN | OXYGEN SATURATION: 96 %

## 2018-05-17 PROCEDURE — A9270 NON-COVERED ITEM OR SERVICE: HCPCS

## 2018-05-17 PROCEDURE — 700111 HCHG RX REV CODE 636 W/ 250 OVERRIDE (IP)

## 2018-05-17 PROCEDURE — 700101 HCHG RX REV CODE 250

## 2018-05-17 PROCEDURE — 160041 HCHG SURGERY MINUTES - EA ADDL 1 MIN LEVEL 4: Performed by: SPECIALIST

## 2018-05-17 PROCEDURE — 160036 HCHG PACU - EA ADDL 30 MINS PHASE I: Performed by: SPECIALIST

## 2018-05-17 PROCEDURE — 160002 HCHG RECOVERY MINUTES (STAT): Performed by: SPECIALIST

## 2018-05-17 PROCEDURE — A4338 INDWELLING CATHETER LATEX: HCPCS | Performed by: SPECIALIST

## 2018-05-17 PROCEDURE — 160009 HCHG ANES TIME/MIN: Performed by: SPECIALIST

## 2018-05-17 PROCEDURE — 501330 HCHG SET, CYSTO IRRIG TUBING: Performed by: SPECIALIST

## 2018-05-17 PROCEDURE — 700102 HCHG RX REV CODE 250 W/ 637 OVERRIDE(OP)

## 2018-05-17 PROCEDURE — 502240 HCHG MISC OR SUPPLY RC 0272: Performed by: SPECIALIST

## 2018-05-17 PROCEDURE — 160035 HCHG PACU - 1ST 60 MINS PHASE I: Performed by: SPECIALIST

## 2018-05-17 PROCEDURE — 500892 HCHG PACK, PERI-GYN: Performed by: SPECIALIST

## 2018-05-17 PROCEDURE — 501838 HCHG SUTURE GENERAL: Performed by: SPECIALIST

## 2018-05-17 PROCEDURE — C1771 REP DEV, URINARY, W/SLING: HCPCS | Performed by: SPECIALIST

## 2018-05-17 PROCEDURE — 160029 HCHG SURGERY MINUTES - 1ST 30 MINS LEVEL 4: Performed by: SPECIALIST

## 2018-05-17 PROCEDURE — 160048 HCHG OR STATISTICAL LEVEL 1-5: Performed by: SPECIALIST

## 2018-05-17 DEVICE — SLING TOT OBTRYX I HALO: Type: IMPLANTABLE DEVICE | Status: FUNCTIONAL

## 2018-05-17 RX ORDER — HALOPERIDOL 5 MG/ML
INJECTION INTRAMUSCULAR
Status: COMPLETED
Start: 2018-05-17 | End: 2018-05-17

## 2018-05-17 RX ORDER — ACETAMINOPHEN 500 MG
1000 TABLET ORAL EVERY 6 HOURS
Status: DISCONTINUED | OUTPATIENT
Start: 2018-05-17 | End: 2018-05-17 | Stop reason: HOSPADM

## 2018-05-17 RX ORDER — SIMETHICONE 80 MG
80 TABLET,CHEWABLE ORAL EVERY 8 HOURS PRN
Status: DISCONTINUED | OUTPATIENT
Start: 2018-05-17 | End: 2018-05-17 | Stop reason: HOSPADM

## 2018-05-17 RX ORDER — IBUPROFEN 800 MG/1
800 TABLET ORAL EVERY 8 HOURS PRN
COMMUNITY
End: 2018-06-06

## 2018-05-17 RX ORDER — BUPIVACAINE HYDROCHLORIDE AND EPINEPHRINE 2.5; 5 MG/ML; UG/ML
INJECTION, SOLUTION INFILTRATION; PERINEURAL
Status: DISCONTINUED | OUTPATIENT
Start: 2018-05-17 | End: 2018-05-17 | Stop reason: HOSPADM

## 2018-05-17 RX ORDER — MEPERIDINE HYDROCHLORIDE 25 MG/ML
INJECTION INTRAMUSCULAR; INTRAVENOUS; SUBCUTANEOUS
Status: COMPLETED
Start: 2018-05-17 | End: 2018-05-17

## 2018-05-17 RX ORDER — SODIUM CHLORIDE, SODIUM LACTATE, POTASSIUM CHLORIDE, CALCIUM CHLORIDE 600; 310; 30; 20 MG/100ML; MG/100ML; MG/100ML; MG/100ML
INJECTION, SOLUTION INTRAVENOUS CONTINUOUS
Status: DISCONTINUED | OUTPATIENT
Start: 2018-05-17 | End: 2018-05-17

## 2018-05-17 RX ORDER — METOCLOPRAMIDE HYDROCHLORIDE 5 MG/ML
10 INJECTION INTRAMUSCULAR; INTRAVENOUS EVERY 4 HOURS PRN
Status: DISCONTINUED | OUTPATIENT
Start: 2018-05-17 | End: 2018-05-17 | Stop reason: HOSPADM

## 2018-05-17 RX ORDER — BUPIVACAINE HYDROCHLORIDE 2.5 MG/ML
INJECTION, SOLUTION EPIDURAL; INFILTRATION; INTRACAUDAL
Status: DISCONTINUED
Start: 2018-05-17 | End: 2018-05-17 | Stop reason: HOSPADM

## 2018-05-17 RX ORDER — ONDANSETRON 2 MG/ML
4 INJECTION INTRAMUSCULAR; INTRAVENOUS EVERY 6 HOURS PRN
Status: DISCONTINUED | OUTPATIENT
Start: 2018-05-17 | End: 2018-05-17 | Stop reason: HOSPADM

## 2018-05-17 RX ORDER — MIDAZOLAM HYDROCHLORIDE 1 MG/ML
INJECTION INTRAMUSCULAR; INTRAVENOUS
Status: DISCONTINUED
Start: 2018-05-17 | End: 2018-05-17 | Stop reason: HOSPADM

## 2018-05-17 RX ORDER — OXYCODONE HYDROCHLORIDE 5 MG/1
10 TABLET ORAL
Status: DISCONTINUED | OUTPATIENT
Start: 2018-05-17 | End: 2018-05-17 | Stop reason: HOSPADM

## 2018-05-17 RX ORDER — OXYCODONE HYDROCHLORIDE AND ACETAMINOPHEN 5; 325 MG/1; MG/1
TABLET ORAL
Status: COMPLETED
Start: 2018-05-17 | End: 2018-05-17

## 2018-05-17 RX ADMIN — FENTANYL CITRATE 50 MCG: 50 INJECTION, SOLUTION INTRAMUSCULAR; INTRAVENOUS at 11:15

## 2018-05-17 RX ADMIN — FENTANYL CITRATE 50 MCG: 50 INJECTION, SOLUTION INTRAMUSCULAR; INTRAVENOUS at 11:31

## 2018-05-17 RX ADMIN — MEPERIDINE HYDROCHLORIDE 12.5 MG: 25 INJECTION INTRAMUSCULAR; INTRAVENOUS; SUBCUTANEOUS at 10:53

## 2018-05-17 RX ADMIN — FENTANYL CITRATE 50 MCG: 50 INJECTION, SOLUTION INTRAMUSCULAR; INTRAVENOUS at 10:45

## 2018-05-17 RX ADMIN — FENTANYL CITRATE 50 MCG: 50 INJECTION, SOLUTION INTRAMUSCULAR; INTRAVENOUS at 10:58

## 2018-05-17 RX ADMIN — HALOPERIDOL LACTATE 1 MG: 5 INJECTION, SOLUTION INTRAMUSCULAR at 11:25

## 2018-05-17 RX ADMIN — SODIUM CHLORIDE, SODIUM LACTATE, POTASSIUM CHLORIDE, CALCIUM CHLORIDE 1000 ML: 600; 310; 30; 20 INJECTION, SOLUTION INTRAVENOUS at 09:00

## 2018-05-17 RX ADMIN — OXYCODONE AND ACETAMINOPHEN 2 TABLET: 5; 325 TABLET ORAL at 10:45

## 2018-05-17 ASSESSMENT — PAIN SCALES - GENERAL
PAINLEVEL_OUTOF10: 5
PAINLEVEL_OUTOF10: 8
PAINLEVEL_OUTOF10: 3
PAINLEVEL_OUTOF10: 4
PAINLEVEL_OUTOF10: 5
PAINLEVEL_OUTOF10: 3
PAINLEVEL_OUTOF10: 3
PAINLEVEL_OUTOF10: 4
PAINLEVEL_OUTOF10: 3

## 2018-05-17 NOTE — OP REPORT
DATE OF SERVICE:  5/17/2018     PREOPERATIVE DIAGNOSES:  Symptomatic pelvic floor relaxation, pelvic pain, type 2   stress urinary incontinence as demonstrated by urodynamic studies.     POSTOPERATIVE DIAGNOSES: Same     PROCEDURES PERFORMED:  Anterior and posterior vaginal repair, enterocele    repair, sacrospinous vault suspension, transobturator tape midurethral sling    procedure, cystoscopy.     SURGEON:  Liam Reyes MD     ASSISTANT:  Varinder Rodriguez MD     ANESTHESIA:  General     ANESTHESIOLOGIST:  Anesthesiologist: José Moss M.D.     ESTIMATED BLOOD LOSS FOR THE PROCEDURE:  Less than 30 mL.     FINDINGS:  Good support of the anterior and posterior vaginal walls in    addition to the apical vaginal tissue without any undue tension in the suture    sites.  Cystoscopy revealed bilateral ureteral efflux and normal bladder and    no undue tension noted at the level of the mid urethra after sling placement    on cystoscopic evaluation.     DESCRIPTION OF PROCEDURE:  The patient was taken to the operating room where    general anesthesia was performed without difficulty.  Patient was then prepped   and draped in usual sterile fashion.  Lower extremities placed in Bimal    stirrups.  Attention was first turned to the perineum where a weighted    speculum was placed with the use of Corbin retractor.  Anterior vaginal mucosa    was then injected with 10 mL of 0.25% Marcaine with epinephrine.  The vaginal    mucosa was then dissected off the underlying pubocervical fascia amparo until    the levator muscles were then reached.  Her previous transobturator tape sling   was resected followed by placement of horizontal mattress sutures    reapproximating the pubocervical fascia in midline in a double layer closure,    thereby reducing the midline cystocele followed by injection of 10 mL of 0.25%   Marcaine with epinephrine lateral to the clitoris in the labial crural folds    followed by stab incision made with the use  of 11 blade scalpel in this    location on each side followed by placement of the respective Obtryx halo    needle through the labial crural incision sites to the obturator membranes    through the endopelvic fascia out through the vaginal mucosal incision at the    level of the mid urethra.  The sling was then applied to the Obtryx halo    needle.  Needle was then taken back up through the original tract.  Tensioning   of position was then performed.  Garrett catheter was removed, which had been    placed at the beginning of the case for placement of a 30-degree cystoscope,    which revealed normal urinary bladder, bilateral ureteral efflux and no undue    tension noted at the level of the mid urethra.  The sling was then released,    tensioning of position was then finalized under direct cystoscopic evaluation.    Cystoscope removed.  Garrett catheter replaced.  All excess vaginal mucosa and   sling material were then excised.  The vaginal mucosa was then reapproximated   with 2-0 Vicryl in a running interlocking fashion in one segment.  Posterior    vaginal mucosa was then injected with 10 mL of 0.25% Marcaine with    epinephrine.  The vaginal mucosa was then dissected off the underlying    rectovaginal fascia amparo until the levator muscles were then reached.     Horizontal mattress sutures were then used to reapproximate the rectovaginal    fascia in the midline in a double 0 closure, thereby reducing the midline    rectocele.  A large enterocele was located at the superior aspect of the    dissection.  Dissection of the sacrospinous process was then performed in the    ischial spine, 3 cm medial along the sacrospinous ligament with straight    catheterization needle device, 0 Ethibond suture was taken through the    sacrospinous ligament taken out through the right apical portion of the    vaginal cuff and the overlying vaginal mucosa.  Once tied down, there was good   reapproximation of the apex of the vaginal  vault to the sacrospinous    ligament.  The rectovaginal septum was then reapproximated in the posterior    aspect of the vaginal cuff in a double pursestring suture, thereby reducing    the large enterocele located at the superior aspect of the dissection.  All    excess vaginal mucosa was then trimmed.  The vaginal mucosa was then    reapproximated with 2-0 Vicryl in running locking fashion in one segment    performing a perineoplasty on the perineum.  The patient tolerated procedure    well and was awoken from general anesthesia, was taken to the recovery in    stable condition.        ____________________________________     CLARE HERNADEZ MD

## 2018-05-17 NOTE — DISCHARGE INSTRUCTIONS
ACTIVITY: Rest and take it easy for the first 24 hours.  A responsible adult is recommended to remain with you during that time.  It is normal to feel sleepy.  We encourage you to not do anything that requires balance, judgment or coordination.    MILD FLU-LIKE SYMPTOMS ARE NORMAL. YOU MAY EXPERIENCE GENERALIZED MUSCLE ACHES, THROAT IRRITATION, HEADACHE AND/OR SOME NAUSEA.    FOR 24 HOURS DO NOT:  Drive, operate machinery or run household appliances.  Drink beer or alcoholic beverages.   Make important decisions or sign legal documents.    SPECIAL INSTRUCTIONS: *CALL DR. HERNADEZ'S OFFICE TOMORROW AFTER 9:OO AM TO HAVE SALAZAR CATHETER REMOVED.  PELVIC REST:  NO TAMPONS, NO DOUCHING AND NO SEXUAL INTERCOURSE UNTIL APPROVED BY DR. HERNADEZ  NO HEAVY LIFTING **    DIET: To avoid nausea, slowly advance diet as tolerated, avoiding spicy or greasy foods for the first day.  Add more substantial food to your diet according to your physician's instructions.  Babies can be fed formula or breast milk as soon as they are hungry.  INCREASE FLUIDS AND FIBER TO AVOID CONSTIPATION.    SURGICAL DRESSING/BATHING: *MAY SHOWER TOMORROW.  NO TUB BATHS, HOT TUBS OR SWIMMING UNTIL APPROVED BY DR. HERNADEZ**    FOLLOW-UP APPOINTMENT:  A follow-up appointment should be arranged with your doctor in *FOLLOW UP WITH DR. HERNADEZ**; call to schedule.    You should CALL YOUR PHYSICIAN if you develop:  Fever greater than 101 degrees F.  Pain not relieved by medication, or persistent nausea or vomiting.  Excessive bleeding (blood soaking through dressing) or unexpected drainage from the wound.  Extreme redness or swelling around the incision site, drainage of pus or foul smelling drainage.  Inability to urinate or empty your bladder within 8 hours.  Problems with breathing or chest pain.    You should call 911 if you develop problems with breathing or chest pain.  If you are unable to contact your doctor or surgical center, you should go to the nearest  emergency room or urgent care center.  Physician's telephone #: **DR. HERNADEZ [phone]    If any questions arise, call your doctor.  If your doctor is not available, please feel free to call the Surgical Center at [phone].  The Center is open Monday through Friday from 7AM to 7PM.  You can also call the HEALTH HOTLINE open 24 hours/day, 7 days/week and speak to a nurse at [phone], or toll free at [phone].    A registered nurse may call you a few days after your surgery to see how you are doing after your procedure.    MEDICATIONS: Resume taking daily medication.  Take prescribed pain medication with food.  If no medication is prescribed, you may take non-aspirin pain medication if needed.  PAIN MEDICATION CAN BE VERY CONSTIPATING.  Take a stool softener or laxative such as senokot, pericolace, or milk of magnesia if needed.    Prescription given for **PATIENT HAS AT HOME*.  Last pain medication given at *___10:45 AM____________________**.    If your physician has prescribed pain medication that includes Acetaminophen (Tylenol), do not take additional Acetaminophen (Tylenol) while taking the prescribed medication.    Depression / Suicide Risk    As you are discharged from this [facility] facility, it is important to learn how to keep safe from harming yourself.    Recognize the warning signs:  · Abrupt changes in personality, positive or negative- including increase in energy   · Giving away possessions  · Change in eating patterns- significant weight changes-  positive or negative  · Change in sleeping patterns- unable to sleep or sleeping all the time   · Unwillingness or inability to communicate  · Depression  · Unusual sadness, discouragement and loneliness  · Talk of wanting to die  · Neglect of personal appearance   · Rebelliousness- reckless behavior  · Withdrawal from people/activities they love  · Confusion- inability to concentrate     If you or a loved one observes any of these  behaviors or has concerns about self-harm, here's what you can do:  · Talk about it- your feelings and reasons for harming yourself  · Remove any means that you might use to hurt yourself (examples: pills, rope, extension cords, firearm)  · Get professional help from the community (Mental Health, Substance Abuse, psychological counseling)  · Do not be alone:Call your Safe Contact- someone whom you trust who will be there for you.  · Call your local CRISIS HOTLINE 410-5226 or 698-196-9377  · Call your local Children's Mobile Crisis Response Team [place] (501) 337-6958 or www."[employer]  · Call the toll free National Suicide Prevention Hotlines   · National Suicide Prevention Lifeline 414-577-VUNS (3949)  · National Hope Line Network 800-SUICIDE (784-9769)

## 2018-05-17 NOTE — OR SURGEON
Immediate Post OP Note    PreOp Diagnosis: Symptomatic pelvic floor relaxation, pelvic pain, type 2   stress urinary incontinence as demonstrated by urodynamic studies.    PostOp Diagnosis: Same    Procedure(s):  ANTERIOR AND POSTERIOR REPAIR/ PERINEOPLASTY - Wound Class: Clean Contaminated  ENTEROCELE REPAIR - Wound Class: Clean Contaminated  BLADDER SLING FEMALE/TOT - Wound Class: Clean Contaminated  VAGINAL SUSPENSION/ SACROSPINOUS - Wound Class: Clean Contaminated    Surgeon(s):  TUNG Fairbanks M.D.    Anesthesiologist/Type of Anesthesia:  Anesthesiologist: José Moss M.D./General    Surgical Staff:  Circulator: Marcella Teresa R.N.  Scrub Person: Britany Corey    Specimens removed if any:  None    Estimated Blood Loss: Less than 30ccs    Findings: Good support of the anterior and the posterior and the apical vaginal tissue without any undue tension at any suture sites, cystoscopy revealed bilateral ureteral efflux, normal bladder and no undue tension at mid urethra after sling placement.    Complications: None        5/17/2018 10:37 AM Liam Reyes M.D.

## 2018-05-17 NOTE — OR NURSING
1035  RECEIVED PATIENT FROM OR.  REPORT FROM DR. GAVIRIA.  LMA IN PLACE AND DC'D BY DR. GAVIRIA.  SELIN PAD IN PLACE - DRY.  SALAZAR TO DD WITH CLEAR, YELLOW NOTED.    1045  MEDICATED WITH IV AND PO PAIN MEDICATION FOR C/O ABDOMINAL PAIN 5.    1053  MEDICATED WITH IV DEMEROL FOR SHIVERING.    1057  SISTER ALMA TO BEDSIDE.       1058  MEDICATED WITH IV FENTANYL FOR C/O ABDOMINAL PAIN 5.    1115  MEDICATED WITH IV FENTANYL FOR PAIN 6.    1125  MEDICATED WITH IV HALDOL FOR C/O NAUSEA.    1131  MEDICATED WITH IV FENTANYL FOR C/O LEFT LEG PAIN 7.  PATIENT REPORTS LEFT LEG FEELS NUMB AND SHE DOES NOT HAVE FULL RANGE OF MOTION OF LEFT LEG.  WILL MONITOR AND CONTACT DR. HERNADEZ.    1145  DR. HERNADEZ NOTIFIED OF LEFT LEG PAIN AND NUMBNESS.  HE STATED THIS IS COMMON FROM BEING IN THE STRIPS.  IT WILL IMPROVE OVER THE NEXT COUPLE OF DAYS.    1322  DISCHARGED.  DISCHARGE INSTRUCTIONS GIVEN TO PATIENT AND HER SISTER.  A VERBAL UNDERSTANDING OF ALL INSTRUCTIONS WAS STATED.  PATIENT TAKING PO AND AMBULATING WITHOUT DIFFICULTY.  PATIENT STATES LEFT LEG IS MUCH BETTER AND SHE IS READY TO GO HOME.

## 2018-06-06 ENCOUNTER — HOSPITAL ENCOUNTER (EMERGENCY)
Facility: MEDICAL CENTER | Age: 28
End: 2018-06-06
Attending: EMERGENCY MEDICINE
Payer: MEDICAID

## 2018-06-06 VITALS
WEIGHT: 216.93 LBS | SYSTOLIC BLOOD PRESSURE: 120 MMHG | BODY MASS INDEX: 29.38 KG/M2 | DIASTOLIC BLOOD PRESSURE: 71 MMHG | OXYGEN SATURATION: 99 % | HEIGHT: 72 IN | TEMPERATURE: 98.7 F | RESPIRATION RATE: 16 BRPM | HEART RATE: 76 BPM

## 2018-06-06 DIAGNOSIS — G89.18 POSTOPERATIVE PAIN: ICD-10-CM

## 2018-06-06 DIAGNOSIS — N93.9 VAGINAL BLEEDING: ICD-10-CM

## 2018-06-06 LAB
BASOPHILS # BLD AUTO: 0.3 % (ref 0–1.8)
BASOPHILS # BLD: 0.03 K/UL (ref 0–0.12)
EOSINOPHIL # BLD AUTO: 0.25 K/UL (ref 0–0.51)
EOSINOPHIL NFR BLD: 2.7 % (ref 0–6.9)
ERYTHROCYTE [DISTWIDTH] IN BLOOD BY AUTOMATED COUNT: 43.4 FL (ref 35.9–50)
HCT VFR BLD AUTO: 43.5 % (ref 37–47)
HGB BLD-MCNC: 14.2 G/DL (ref 12–16)
IMM GRANULOCYTES # BLD AUTO: 0.02 K/UL (ref 0–0.11)
IMM GRANULOCYTES NFR BLD AUTO: 0.2 % (ref 0–0.9)
LYMPHOCYTES # BLD AUTO: 2.44 K/UL (ref 1–4.8)
LYMPHOCYTES NFR BLD: 26.4 % (ref 22–41)
MCH RBC QN AUTO: 27.6 PG (ref 27–33)
MCHC RBC AUTO-ENTMCNC: 32.6 G/DL (ref 33.6–35)
MCV RBC AUTO: 84.6 FL (ref 81.4–97.8)
MONOCYTES # BLD AUTO: 0.53 K/UL (ref 0–0.85)
MONOCYTES NFR BLD AUTO: 5.7 % (ref 0–13.4)
NEUTROPHILS # BLD AUTO: 5.98 K/UL (ref 2–7.15)
NEUTROPHILS NFR BLD: 64.7 % (ref 44–72)
NRBC # BLD AUTO: 0 K/UL
NRBC BLD-RTO: 0 /100 WBC
PLATELET # BLD AUTO: 237 K/UL (ref 164–446)
PMV BLD AUTO: 11 FL (ref 9–12.9)
RBC # BLD AUTO: 5.14 M/UL (ref 4.2–5.4)
WBC # BLD AUTO: 9.3 K/UL (ref 4.8–10.8)

## 2018-06-06 PROCEDURE — 96374 THER/PROPH/DIAG INJ IV PUSH: CPT

## 2018-06-06 PROCEDURE — 700111 HCHG RX REV CODE 636 W/ 250 OVERRIDE (IP): Performed by: EMERGENCY MEDICINE

## 2018-06-06 PROCEDURE — 96375 TX/PRO/DX INJ NEW DRUG ADDON: CPT

## 2018-06-06 PROCEDURE — 36415 COLL VENOUS BLD VENIPUNCTURE: CPT

## 2018-06-06 PROCEDURE — 99285 EMERGENCY DEPT VISIT HI MDM: CPT

## 2018-06-06 PROCEDURE — 85025 COMPLETE CBC W/AUTO DIFF WBC: CPT

## 2018-06-06 RX ORDER — OXYCODONE HYDROCHLORIDE AND ACETAMINOPHEN 5; 325 MG/1; MG/1
1 TABLET ORAL EVERY 6 HOURS PRN
Qty: 15 TAB | Refills: 0 | Status: SHIPPED | OUTPATIENT
Start: 2018-06-06 | End: 2018-06-10

## 2018-06-06 RX ORDER — ONDANSETRON 2 MG/ML
4 INJECTION INTRAMUSCULAR; INTRAVENOUS ONCE
Status: COMPLETED | OUTPATIENT
Start: 2018-06-06 | End: 2018-06-06

## 2018-06-06 RX ORDER — MORPHINE SULFATE 4 MG/ML
4 INJECTION, SOLUTION INTRAMUSCULAR; INTRAVENOUS ONCE
Status: COMPLETED | OUTPATIENT
Start: 2018-06-06 | End: 2018-06-06

## 2018-06-06 RX ADMIN — MORPHINE SULFATE 4 MG: 4 INJECTION INTRAVENOUS at 18:14

## 2018-06-06 RX ADMIN — ONDANSETRON 4 MG: 2 INJECTION INTRAMUSCULAR; INTRAVENOUS at 18:12

## 2018-06-06 ASSESSMENT — PAIN SCALES - GENERAL
PAINLEVEL_OUTOF10: 0
PAINLEVEL_OUTOF10: 4
PAINLEVEL_OUTOF10: 6

## 2018-06-06 NOTE — ED TRIAGE NOTES
Chief Complaint   Patient presents with   • Vaginal Bleeding     pt reports to have had a bladder sling placed on May 17th,pt reports mild cramping vaginal bleeding since, increased bleeding (1 pad an hour) last night.      Explained to pt triage process, made pt aware to tell this RN of any changes/concerns, pt verbalized understanding of process and instructions given. Pt to ER [place].

## 2018-06-07 NOTE — DISCHARGE INSTRUCTIONS
____________________    Call Dr. Reyes in the a.m. Dr. Borrero felt he should be seen tomorrow in the office. Take the laboratory results with you. Medication as written. Return to ED for increasing bleeding pain fever or any other problem  Document Released: 12/18/2006 Document Revised: 12/04/2013 Document Reviewed: 12/18/2006  [employer]® Patient Information ©2014 [employer].

## 2018-06-07 NOTE — ED NOTES
PIV removed. Catheter intact. Dressing applied. Bleeding controlled. D/C instructions reviewed in detail with pt. Pt states understanding and need for follow-up. Scripts reviewed in detail and given x1. Pt left *ambulatory with a steady gait. Boyfriend will drive home.

## 2018-06-07 NOTE — ED PROVIDER NOTES
ED Provider Note    HPI: Patient is a 27-year-old female who presented to the emergency department June 6, 2018 at 4:20 PMwith a chief complaint of vaginal bleeding    Patient had a anterior posterior vaginal repair and bladder suspension about 2 weeks ago. She's had vaginal bleeding over the last 3 days along with some pelvic pain. She's had no fever or vomiting. No chest pain no shortness of breath nausea and dizziness no nausea no vomiting. No other somatic complaints    Review of Systems: Positive for pelvic pain and vaginal bleeding negative for fever chills cough nausea vomiting chest pain shortness of breath dizziness. Review of systems reviewed with patient, all other systems negative    Past medical/surgical history: Heartburn hernia anxiety endometriosis recent surgical history as above     Medications: None    Allergies: Imitrex Vicodin but tolerates Percocet    Social History: Patient smokes quarter pack cigarettes per day social user of alcohol      Physical exam: Constitutional:  Pleasant female awake alert  Vital signs:    EYES: PERRL, EOMI, Conjunctivae and sclera normal, eyelids normal bilaterally.  Neck: Trachea midline. No cervical masses seen or palpated. Normal range of motion, supple. No meningeal signs elicited.  Cardiac: Regular rate and rhythm. S1-S2 present. No S3 or S4 present. No murmurs, rubs, or gallops heard. No edema or varicosities were seen.   Lungs: Clear to auscultation with good aeration throughout. No wheezes, rales, or rhonchi heard. Patient's chest wall moved symmetrically with each respiratory effort. Patient was not making use of accessory muscles of respiration in breathing.  Abdomen: Soft nontender to palpation. No rebound or guarding elicited. No organomegaly identified. No pulsatile abdominal masses identified. Pelvic exam external genitalia normal no vaginal lesions seen. Suture line is visualized and it did appear to be a slight amount of nonpulsatile bleeding. No  significant blood in the vaginal vault. Area was somewhat tender.  Musculoskeletal:  no  pain with palpitation or movement of muscle, bone or joint , no obvious musculoskeletal deformities identified.  Neurologic: alert and awake answers questions appropriately. Moves all four extremities independently, no gross focal abnormalities identified. Normal strength and motor.  Skin: no rash or lesion seen, no palpable dermatologic lesions identified.  Psychiatric: Patient appeared to be slightly anxious but appropriately so. She was not delusional or hallucinating.    Medical decision making: CBC obtained, normal hemoglobin and hematocrit of 14.2 and 43.5, no evidence of anemia. White count is normal at 9.3 with a normal differential, no evidence of infection     I spoke with Dr. Borrero. The patient appears to have some postoperative pain and some mild vaginal bleeding at this time. I saw no evidence of pulsatile bleeding on exam and the patient's abdominal exam shows no rebound or guarding. He feels the patient should be seen in the office tomorrow. The patient is given copies of labs and a prescription for a small amount of pain medication. Patient will follow up tomorrow with her gynecologist. The patient is given careful instructions to return to ED immediately for increasing pain bleeding fever vomiting or any other problems    Patient verbalized understanding of above instructions and states she'll comply            I reviewed prescription monitoring program for patient's narcotic use before prescribing a scheduled drug.The patient will not drink alcohol nor drive with prescribed medications. The patient will return for new or worsening symptoms and is stable at the time of discharge.        In prescribing controlled substances to this patient, I certify that I have obtained and reviewed the medical history of . I have also made a good carolina effort to obtain applicable records from other providers who have treated  the patient and records did not demonstrate any increased risk of substance abuse that would prevent me from prescribing controlled substances.      I have conducted a physical exam and documented it. I have reviewed Ms. Wilmar Chapman' prescription history as maintained by the [place] Prescription Monitoring Program.      I have assessed the patient’s risk for abuse, dependency, and addiction using the validated Opioid Risk Tool available at https://www.mdcalc.com/ggichc-zhnz-ttzl-ort-narcotic-abuse.      Given the above, I believe the benefits of controlled substance therapy outweigh the risks. The reasons for prescribing controlled substances include in my professional opinion, controlled substances are the only reasonable choice for this patientAccordingly, I have discussed the risk and benefits, treatment plan, and alternative therapies with the patient.     Impression 1) vaginal bleeding  2) post operative pain

## 2018-07-21 ENCOUNTER — HOSPITAL ENCOUNTER (EMERGENCY)
Dept: HOSPITAL 8 - ED | Age: 28
End: 2018-07-21
Payer: MEDICAID

## 2018-07-21 VITALS — WEIGHT: 211.64 LBS | HEIGHT: 72 IN | BODY MASS INDEX: 28.67 KG/M2

## 2018-07-21 VITALS — SYSTOLIC BLOOD PRESSURE: 126 MMHG | DIASTOLIC BLOOD PRESSURE: 86 MMHG

## 2018-07-21 DIAGNOSIS — H60.12: Primary | ICD-10-CM

## 2018-07-21 DIAGNOSIS — J45.909: ICD-10-CM

## 2018-07-21 DIAGNOSIS — K21.9: ICD-10-CM

## 2018-07-21 PROCEDURE — 99283 EMERGENCY DEPT VISIT LOW MDM: CPT

## 2018-11-14 ENCOUNTER — HOSPITAL ENCOUNTER (EMERGENCY)
Dept: HOSPITAL 8 - ED | Age: 28
Discharge: HOME | End: 2018-11-14
Payer: MEDICAID

## 2018-11-14 VITALS — SYSTOLIC BLOOD PRESSURE: 129 MMHG | DIASTOLIC BLOOD PRESSURE: 87 MMHG

## 2018-11-14 VITALS — WEIGHT: 205.03 LBS | BODY MASS INDEX: 27.77 KG/M2 | HEIGHT: 72 IN

## 2018-11-14 DIAGNOSIS — S61.210A: Primary | ICD-10-CM
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
## 2018-11-14 DIAGNOSIS — F17.210: ICD-10-CM
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
## 2018-11-14 PROCEDURE — 90715 TDAP VACCINE 7 YRS/> IM: CPT

## 2018-11-14 PROCEDURE — 99283 EMERGENCY DEPT VISIT LOW MDM: CPT

## 2018-11-14 PROCEDURE — 90471 IMMUNIZATION ADMIN: CPT

## 2019-01-08 ENCOUNTER — HOSPITAL ENCOUNTER (EMERGENCY)
Dept: HOSPITAL 8 - ED | Age: 29
End: 2019-01-08
Payer: MEDICAID

## 2019-01-08 VITALS — SYSTOLIC BLOOD PRESSURE: 126 MMHG | DIASTOLIC BLOOD PRESSURE: 82 MMHG

## 2019-01-08 VITALS — WEIGHT: 203.93 LBS | BODY MASS INDEX: 27.62 KG/M2 | HEIGHT: 72 IN

## 2019-01-08 DIAGNOSIS — K21.9: ICD-10-CM

## 2019-01-08 DIAGNOSIS — Z88.8: ICD-10-CM

## 2019-01-08 DIAGNOSIS — M67.431: Primary | ICD-10-CM

## 2019-01-08 DIAGNOSIS — Z88.5: ICD-10-CM

## 2019-01-08 DIAGNOSIS — F17.200: ICD-10-CM

## 2019-01-08 PROCEDURE — 29260 STRAPPING OF ELBOW OR WRIST: CPT

## 2019-03-31 ENCOUNTER — HOSPITAL ENCOUNTER (EMERGENCY)
Facility: MEDICAL CENTER | Age: 29
End: 2019-03-31
Attending: EMERGENCY MEDICINE
Payer: MEDICAID

## 2019-03-31 VITALS
BODY MASS INDEX: 29.26 KG/M2 | DIASTOLIC BLOOD PRESSURE: 84 MMHG | HEART RATE: 101 BPM | OXYGEN SATURATION: 97 % | TEMPERATURE: 97.6 F | RESPIRATION RATE: 16 BRPM | HEIGHT: 72 IN | SYSTOLIC BLOOD PRESSURE: 126 MMHG | WEIGHT: 216.05 LBS

## 2019-03-31 DIAGNOSIS — S83.92XA SPRAIN OF LEFT KNEE, UNSPECIFIED LIGAMENT, INITIAL ENCOUNTER: ICD-10-CM

## 2019-03-31 DIAGNOSIS — M25.562 PATELLOFEMORAL ARTHRALGIA OF LEFT KNEE: ICD-10-CM

## 2019-03-31 PROCEDURE — 700102 HCHG RX REV CODE 250 W/ 637 OVERRIDE(OP): Performed by: EMERGENCY MEDICINE

## 2019-03-31 PROCEDURE — A9270 NON-COVERED ITEM OR SERVICE: HCPCS | Performed by: EMERGENCY MEDICINE

## 2019-03-31 PROCEDURE — 99284 EMERGENCY DEPT VISIT MOD MDM: CPT

## 2019-03-31 RX ORDER — IBUPROFEN 600 MG/1
600 TABLET ORAL ONCE
Status: COMPLETED | OUTPATIENT
Start: 2019-03-31 | End: 2019-03-31

## 2019-03-31 RX ORDER — ACETAMINOPHEN 500 MG
1000 TABLET ORAL ONCE
Status: COMPLETED | OUTPATIENT
Start: 2019-03-31 | End: 2019-03-31

## 2019-03-31 RX ADMIN — ACETAMINOPHEN 1000 MG: 500 TABLET ORAL at 21:23

## 2019-03-31 RX ADMIN — IBUPROFEN 600 MG: 600 TABLET ORAL at 21:23

## 2019-04-01 NOTE — ED PROVIDER NOTES
ED Provider Note    CHIEF COMPLAINT  Chief Complaint   Patient presents with   • Knee Pain     left       HPI  Jewell Chapman is a 28 y.o. female who presents to the emergency department with acute on chronic left knee pain.  Patient states she is been seen at the [facility] and has been trying to get an orthopod but no one will take her insurance and she has primary care follow-up in a month and a half.  She states that she is to go to physical therapy and then things got better so she stopped doing it but over the last few months her left knee is been bothering her today she ran to catch her niece that she was falling off a playground equipment and felt an acute pop and pain in her left knee.  She last took ibuprofen at 1:00 today and states it really did not help she had no swelling she is not been icing states he feels better when bent there is no weakness numbness or tingling associated no hip or ankle pain associated with it.  Pain is currently a 5 out of 10 worse with movement    REVIEW OF SYSTEMS  Positives as above. Pertinent negatives include weakness numbness tingling ankle or hip pain  All other review of systems are negative    PAST MEDICAL HISTORY   has a past medical history of Anxiety; BV (bacterial vaginosis) (4/5/2010); Endometriosis; Heartburn; Hernia of unspecified site of abdominal cavity without mention of obstruction or gangrene; Hernia of unspecified site of abdominal cavity without mention of obstruction or gangrene; HPV (human papilloma virus) anogenital infection; Hypoglycemia; Pregnancy; Prenatal hydronephrosis in pregnancy, antepartum condition (4/5/2010); and Umbilical hernia (Dx 5/2009).    SOCIAL HISTORY  Social History     Social History Main Topics   • Smoking status: Current Every Day Smoker     Packs/day: 0.25     Years: 12.00     Types: Cigarettes   • Smokeless tobacco: Never Used      Comment: 1/4 pack per day   • Alcohol use Yes      Comment: Socially   • Drug use: No    • Sexual activity: Not Currently     Partners: Male     Birth control/ protection: Pill       SURGICAL HISTORY   has a past surgical history that includes other abdominal surgery; pelviscopy (4/18/2013); ovarian cystectomy (4/18/2013); tubal coagulation laparoscopic bilateral (4/18/2013); hysterectomy robotic; anterior and posterior repair (N/A, 5/17/2018); enterocele repair (N/A, 5/17/2018); bladder sling female (5/17/2018); and vaginal suspension (5/17/2018).    CURRENT MEDICATIONS  Home Medications     Reviewed by Esau Hernandez R.N. (Registered Nurse) on 03/31/19 at 2059  Med List Status: Not Addressed   Medication Last Dose Status        Patient Larry Taking any Medications                       ALLERGIES  Allergies   Allergen Reactions   • Imitrex [Sumatriptan Succinate] Anaphylaxis   • Hydrocodone      Hives and itching       PHYSICAL EXAM  VITAL SIGNS: /84   Pulse (!) 101   Temp 36.4 °C (97.6 °F)   Resp 16   Ht 1.829 m (6')   Wt 98 kg (216 lb 0.8 oz)   LMP 02/04/2014   SpO2 97%   BMI 29.30 kg/m²    Pulse ox interpretation: I interpret this pulse ox as normal.  Constitutional: Alert in no apparent distress.  HENT: Normocephalic, Atraumatic, MMM  Eyes: PERound. Conjunctiva normal, non-icteric.   Heart: Normal peripheral perfusion DP pulse 2+  Lungs: Symmetrical expansion no respiratory distress  EXT: Some tenderness of the left knee over the patellofemoral tendon no joint effusion she does have a little bit of tenderness with internal rotation of the knee along the lateral aspect otherwise negative anterior posterior drawer test normal distal perfusion sensation intact light touch distally  Skin: Warm, Dry, No erythema, No rash.   Neurologic: Alert and oriented, Grossly non-focal.       DIFFERENTIAL DIAGNOSIS AND WORK UP PLAN    This is a 28 y.o. female who presents with acute on chronic knee pain without joint effusion no skin instability that I can appreciate she does have some tenderness  with some internal rotation so possibly the lateral meniscus injury this is an acute on chronic injury she is Richard seen physical therapy for she has a little bit of patellofemoral discomfort a possible arthritis.  I discussed with the patient rice treatment and NSAIDs and ACE wrapping at home, she states it hurts to walk so we gave her some crutches to walk with assist over the next 2 days but is important that she follows up with her primary care provider and start to do the physical therapy routines that she was told to do the last time she injured it.    The patient will return for new or worsening symptoms and is stable at the time of discharge.    The patient is referred to a primary physician for blood pressure management, diabetic screening, and for all other preventative health concerns.    DISPOSITION:  Patient will be discharged home in stable condition.    FOLLOW UP:  [facility], Emergency Dept  [address] [id number] [phone]    If symptoms worsen    Primary care provider    Go on 5/25/2019  as scheduled      OUTPATIENT MEDICATIONS:  There are no discharge medications for this patient.        FINAL IMPRESSION  1. Sprain of left knee, unspecified ligament, initial encounter    2. Patellofemoral arthralgia of left knee               Electronically signed by: Michaelle Zheng, 3/31/2019 9:35 PM    This dictation has been created using voice recognition software and/or scribes. The accuracy of the dictation is limited by the abilities of the software and the expertise of the scribes. I expect there may be some errors of grammar and possibly content. I made every attempt to manually correct the errors within my dictation. However, errors related to voice recognition software and/or scribes may still exist and should be interpreted within the appropriate context.

## 2019-04-01 NOTE — ED NOTES
"Pt ambulatory to room 72 at this time, steady gait noted. Pt c/o left knee pain since about noon today. Pt states that she ran and felt something \"pull\" so she took Ibuprofen at home without relief.   "

## 2019-04-01 NOTE — ED NOTES
Discharge instructions given to pt. Prescriptions unchanged. Pt educated, verbalizes understanding. All belongings accounted for. Pt ambulated out of ED with crutches to go home by self. Pt medicated per mar for pain.

## 2019-04-01 NOTE — ED TRIAGE NOTES
Chief Complaint   Patient presents with   • Knee Pain     left     /84   Pulse (!) 101   Temp 36.4 °C (97.6 °F)   Resp 16   Ht 1.829 m (6')   Wt 98 kg (216 lb 0.8 oz)   LMP 02/04/2014   SpO2 97%   BMI 29.30 kg/m²     Pt presents to the Ed with left knee pain. Pt states that this problem has been persistent  for several years. Earlier today pt had to run and catch niece from falling and felt something 'pop' in her knee.     Pain is described as burning, has sensation in leg, states that there is on and off tingling in left foot.     No deformity noticed on inspection, pt is able to extend leg.     Pt back out to lobby.

## 2019-04-24 ENCOUNTER — HOSPITAL ENCOUNTER (OUTPATIENT)
Dept: RADIOLOGY | Facility: MEDICAL CENTER | Age: 29
End: 2019-04-24
Attending: STUDENT IN AN ORGANIZED HEALTH CARE EDUCATION/TRAINING PROGRAM
Payer: MEDICAID

## 2019-04-24 DIAGNOSIS — M25.562 LEFT KNEE PAIN, UNSPECIFIED CHRONICITY: ICD-10-CM

## 2019-04-24 PROCEDURE — 73721 MRI JNT OF LWR EXTRE W/O DYE: CPT | Mod: LT

## 2019-08-23 ENCOUNTER — NON-PROVIDER VISIT (OUTPATIENT)
Dept: OCCUPATIONAL MEDICINE | Facility: CLINIC | Age: 29
End: 2019-08-23

## 2019-08-23 DIAGNOSIS — Z02.1 PRE-EMPLOYMENT DRUG SCREENING: ICD-10-CM

## 2019-08-23 DIAGNOSIS — Z11.1 PPD SCREENING TEST: ICD-10-CM

## 2019-08-23 LAB
AMP AMPHETAMINE: NORMAL
COC COCAINE: NORMAL
INT CON NEG: NORMAL
INT CON POS: NORMAL
MET METHAMPHETAMINES: NORMAL
OPI OPIATES: NORMAL
PCP PHENCYCLIDINE: NORMAL
POC DRUG COMMENT 753798-OCCUPATIONAL HEALTH: NORMAL
THC: NORMAL

## 2019-08-23 PROCEDURE — 80305 DRUG TEST PRSMV DIR OPT OBS: CPT | Performed by: NURSE PRACTITIONER

## 2019-08-23 PROCEDURE — 86580 TB INTRADERMAL TEST: CPT | Performed by: NURSE PRACTITIONER

## 2019-08-25 ENCOUNTER — NON-PROVIDER VISIT (OUTPATIENT)
Dept: URGENT CARE | Facility: CLINIC | Age: 29
End: 2019-08-25

## 2019-08-25 LAB — TB WHEAL 3D P 5 TU DIAM: NORMAL MM

## 2019-11-08 ENCOUNTER — NON-PROVIDER VISIT (OUTPATIENT)
Dept: OCCUPATIONAL MEDICINE | Facility: CLINIC | Age: 29
End: 2019-11-08

## 2019-11-08 DIAGNOSIS — Z02.1 PRE-EMPLOYMENT DRUG SCREENING: ICD-10-CM

## 2019-11-08 LAB
AMP AMPHETAMINE: NORMAL
COC COCAINE: NORMAL
INT CON NEG: NORMAL
INT CON POS: NORMAL
MET METHAMPHETAMINES: NORMAL
OPI OPIATES: NORMAL
PCP PHENCYCLIDINE: NORMAL
POC DRUG COMMENT 753798-OCCUPATIONAL HEALTH: NEGATIVE
THC: NORMAL

## 2019-11-08 PROCEDURE — 80305 DRUG TEST PRSMV DIR OPT OBS: CPT | Performed by: NURSE PRACTITIONER

## 2020-12-03 ENCOUNTER — HOSPITAL ENCOUNTER (EMERGENCY)
Dept: HOSPITAL 8 - ED | Age: 30
Discharge: HOME | End: 2020-12-03
Payer: MEDICAID

## 2020-12-03 VITALS — HEIGHT: 72 IN | WEIGHT: 215.39 LBS | BODY MASS INDEX: 29.17 KG/M2

## 2020-12-03 VITALS — SYSTOLIC BLOOD PRESSURE: 103 MMHG | DIASTOLIC BLOOD PRESSURE: 63 MMHG

## 2020-12-03 DIAGNOSIS — R07.9: Primary | ICD-10-CM

## 2020-12-03 DIAGNOSIS — J45.909: ICD-10-CM

## 2020-12-03 LAB
ALBUMIN SERPL-MCNC: 3.6 G/DL (ref 3.4–5)
ANION GAP SERPL CALC-SCNC: 4 MMOL/L (ref 5–15)
BASOPHILS # BLD AUTO: 0 X10^3/UL (ref 0–0.1)
BASOPHILS NFR BLD AUTO: 1 % (ref 0–1)
CALCIUM SERPL-MCNC: 8.7 MG/DL (ref 8.5–10.1)
CHLORIDE SERPL-SCNC: 110 MMOL/L (ref 98–107)
CREAT SERPL-MCNC: 0.93 MG/DL (ref 0.55–1.02)
EOSINOPHIL # BLD AUTO: 0.1 X10^3/UL (ref 0–0.4)
EOSINOPHIL NFR BLD AUTO: 1 % (ref 1–7)
ERYTHROCYTE [DISTWIDTH] IN BLOOD BY AUTOMATED COUNT: 14.1 % (ref 9.6–15.2)
LYMPHOCYTES # BLD AUTO: 1.6 X10^3/UL (ref 1–3.4)
LYMPHOCYTES NFR BLD AUTO: 21 % (ref 22–44)
MCH RBC QN AUTO: 28.7 PG (ref 27–34.8)
MCHC RBC AUTO-ENTMCNC: 33.5 G/DL (ref 32.4–35.8)
MD: NO
MONOCYTES # BLD AUTO: 0.5 X10^3/UL (ref 0.2–0.8)
MONOCYTES NFR BLD AUTO: 6 % (ref 2–9)
NEUTROPHILS # BLD AUTO: 5.4 X10^3/UL (ref 1.8–6.8)
NEUTROPHILS NFR BLD AUTO: 71 % (ref 42–75)
PLATELET # BLD AUTO: 278 X10^3/UL (ref 130–400)
PMV BLD AUTO: 8.8 FL (ref 7.4–10.4)
RBC # BLD AUTO: 5.1 X10^6/UL (ref 3.82–5.3)
TROPONIN I SERPL-MCNC: < 0.015 NG/ML (ref 0–0.04)

## 2020-12-03 PROCEDURE — 85379 FIBRIN DEGRADATION QUANT: CPT

## 2020-12-03 PROCEDURE — 71045 X-RAY EXAM CHEST 1 VIEW: CPT

## 2020-12-03 PROCEDURE — 36415 COLL VENOUS BLD VENIPUNCTURE: CPT

## 2020-12-03 PROCEDURE — 84484 ASSAY OF TROPONIN QUANT: CPT

## 2020-12-03 PROCEDURE — 80048 BASIC METABOLIC PNL TOTAL CA: CPT

## 2020-12-03 PROCEDURE — 96375 TX/PRO/DX INJ NEW DRUG ADDON: CPT

## 2020-12-03 PROCEDURE — 93005 ELECTROCARDIOGRAM TRACING: CPT

## 2020-12-03 PROCEDURE — 85025 COMPLETE CBC W/AUTO DIFF WBC: CPT

## 2020-12-03 PROCEDURE — 82040 ASSAY OF SERUM ALBUMIN: CPT

## 2020-12-03 PROCEDURE — 99285 EMERGENCY DEPT VISIT HI MDM: CPT

## 2020-12-03 PROCEDURE — 83880 ASSAY OF NATRIURETIC PEPTIDE: CPT

## 2020-12-03 PROCEDURE — 96374 THER/PROPH/DIAG INJ IV PUSH: CPT

## 2020-12-03 PROCEDURE — 96376 TX/PRO/DX INJ SAME DRUG ADON: CPT

## 2020-12-03 NOTE — NUR
C/O CP X "A COUPLE OF DAYS", TOOK IBUPROFEN THIS AM 0330.  DEEP BREATHING 
RESULTS IN DIZZINESS.  DENIES COVID EXPOSURE, COUGH, FEVER.  HOUSEMATE NOT ILL. 
 LMP: N/A - HYSTERECTOMY 2014.  LAST ORAL INTAKE: 1800 12/2/20.  LAST BM: 
YESTERDAY.

## 2020-12-03 NOTE — NUR
RESTING QUIETLY ON GURNEY.  ZOFRAN AND MORPHINE GIVEN PER EMAR.  REPEAT EKG 
DONE.  

-------------------------------------------------------------------------------

Addendum: 12/03/20 at 1033 by COMFORT

-------------------------------------------------------------------------------

CARDIAC MONITORING CONTINUING: MOE

## 2020-12-27 ENCOUNTER — HOSPITAL ENCOUNTER (EMERGENCY)
Dept: HOSPITAL 8 - ED | Age: 30
Discharge: HOME | End: 2020-12-27
Payer: MEDICAID

## 2020-12-27 VITALS — DIASTOLIC BLOOD PRESSURE: 57 MMHG | SYSTOLIC BLOOD PRESSURE: 99 MMHG

## 2020-12-27 VITALS — WEIGHT: 209.88 LBS | HEIGHT: 72 IN | BODY MASS INDEX: 28.43 KG/M2
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
## 2020-12-27 DIAGNOSIS — S50.11XA: Primary | ICD-10-CM
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
## 2020-12-27 DIAGNOSIS — F17.200: ICD-10-CM

## 2020-12-27 PROCEDURE — 99283 EMERGENCY DEPT VISIT LOW MDM: CPT
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
## 2021-04-01 ENCOUNTER — HOSPITAL ENCOUNTER (EMERGENCY)
Facility: MEDICAL CENTER | Age: 31
End: 2021-04-01
Payer: MEDICAID

## 2021-04-01 VITALS
RESPIRATION RATE: 16 BRPM | HEART RATE: 95 BPM | DIASTOLIC BLOOD PRESSURE: 84 MMHG | OXYGEN SATURATION: 100 % | WEIGHT: 217.81 LBS | SYSTOLIC BLOOD PRESSURE: 127 MMHG | TEMPERATURE: 98.2 F | BODY MASS INDEX: 29.5 KG/M2 | HEIGHT: 72 IN

## 2021-04-01 PROCEDURE — 302449 STATCHG TRIAGE ONLY (STATISTIC)

## 2021-04-02 NOTE — ED TRIAGE NOTES
Chief Complaint   Patient presents with   • Leg Pain     29 yo female ambulatory to triage for above complaint. Pt reports last night she started to have sharp R gluteal pain shooting down her RLE, denies trauma, CMS intact. Reports small amount of swelling to area.     Educated on triage process, encourage to inform staff of any changes.     /84   Pulse 95   Temp 36.8 °C (98.2 °F) (Temporal)   Resp 16   Ht 1.829 m (6')   Wt 98.8 kg (217 lb 13 oz)   LMP 02/04/2014   SpO2 100%   BMI 29.54 kg/m²

## 2021-05-10 ENCOUNTER — HOSPITAL ENCOUNTER (EMERGENCY)
Dept: HOSPITAL 8 - ED | Age: 31
Discharge: HOME | End: 2021-05-10
Payer: MEDICAID

## 2021-05-10 VITALS — BODY MASS INDEX: 28.67 KG/M2 | HEIGHT: 72 IN | WEIGHT: 211.64 LBS

## 2021-05-10 VITALS — DIASTOLIC BLOOD PRESSURE: 80 MMHG | SYSTOLIC BLOOD PRESSURE: 131 MMHG

## 2021-05-10 DIAGNOSIS — K21.9: ICD-10-CM
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
## 2021-05-10 DIAGNOSIS — Z90.710: ICD-10-CM

## 2021-05-10 DIAGNOSIS — F17.210: ICD-10-CM

## 2021-05-10 DIAGNOSIS — M25.562: Primary | ICD-10-CM
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
## 2021-05-10 DIAGNOSIS — J45.909: ICD-10-CM

## 2021-05-10 PROCEDURE — 99283 EMERGENCY DEPT VISIT LOW MDM: CPT

## 2021-05-10 PROCEDURE — 99406 BEHAV CHNG SMOKING 3-10 MIN: CPT

## 2021-05-10 NOTE — NUR
PT COMPLAINS OF LEFT KNEE PAIN. DENIES INJURY. HAS SEEN ORTHOPEDICS IN THE PAST 
FOR SAME. AMBULATED TO ROOM WITH STEADY GAIT.

## 2021-07-07 ENCOUNTER — HOSPITAL ENCOUNTER (OUTPATIENT)
Facility: MEDICAL CENTER | Age: 31
End: 2021-07-07
Attending: PREVENTIVE MEDICINE
Payer: COMMERCIAL

## 2021-07-07 ENCOUNTER — EH NON-PROVIDER (OUTPATIENT)
Dept: OCCUPATIONAL MEDICINE | Facility: CLINIC | Age: 31
End: 2021-07-07

## 2021-07-07 ENCOUNTER — EMPLOYEE HEALTH (OUTPATIENT)
Dept: OCCUPATIONAL MEDICINE | Facility: CLINIC | Age: 31
End: 2021-07-07

## 2021-07-07 VITALS
SYSTOLIC BLOOD PRESSURE: 128 MMHG | HEIGHT: 72 IN | HEART RATE: 102 BPM | DIASTOLIC BLOOD PRESSURE: 68 MMHG | WEIGHT: 218 LBS | TEMPERATURE: 98.5 F | OXYGEN SATURATION: 96 % | BODY MASS INDEX: 29.53 KG/M2

## 2021-07-07 DIAGNOSIS — Z02.1 PRE-EMPLOYMENT HEALTH SCREENING EXAMINATION: ICD-10-CM

## 2021-07-07 DIAGNOSIS — Z02.1 PRE-EMPLOYMENT DRUG SCREENING: Primary | ICD-10-CM

## 2021-07-07 DIAGNOSIS — Z02.1 PRE-EMPLOYMENT DRUG SCREENING: ICD-10-CM

## 2021-07-07 LAB
AMP AMPHETAMINE: NORMAL
BAR BARBITURATES: NORMAL
BZO BENZODIAZEPINES: NORMAL
COC COCAINE: NORMAL
INT CON NEG: NORMAL
INT CON POS: NORMAL
MDMA ECSTASY: NORMAL
MET METHAMPHETAMINES: NORMAL
MTD METHADONE: NORMAL
OPI OPIATES: NORMAL
OXY OXYCODONE: NORMAL
PCP PHENCYCLIDINE: NORMAL
POC URINE DRUG SCREEN OCDRS: NEGATIVE
THC: NORMAL
VZV IGG SER IA-ACNC: 2.47

## 2021-07-07 PROCEDURE — 90746 HEPB VACCINE 3 DOSE ADULT IM: CPT | Performed by: NURSE PRACTITIONER

## 2021-07-07 PROCEDURE — 80305 DRUG TEST PRSMV DIR OPT OBS: CPT | Performed by: PREVENTIVE MEDICINE

## 2021-07-07 PROCEDURE — 86787 VARICELLA-ZOSTER ANTIBODY: CPT | Performed by: PREVENTIVE MEDICINE

## 2021-07-07 PROCEDURE — 86480 TB TEST CELL IMMUN MEASURE: CPT | Performed by: PREVENTIVE MEDICINE

## 2021-07-07 PROCEDURE — 8915 PR COMPREHENSIVE PHYSICAL: Performed by: NURSE PRACTITIONER

## 2021-07-09 LAB
GAMMA INTERFERON BACKGROUND BLD IA-ACNC: 0.03 IU/ML
M TB IFN-G BLD-IMP: NEGATIVE
M TB IFN-G CD4+ BCKGRND COR BLD-ACNC: 0 IU/ML
MITOGEN IGNF BCKGRD COR BLD-ACNC: >10 IU/ML
QFT TB2 - NIL TBQ2: 0 IU/ML

## 2021-07-14 ENCOUNTER — EH NON-PROVIDER (OUTPATIENT)
Dept: OCCUPATIONAL MEDICINE | Facility: CLINIC | Age: 31
End: 2021-07-14

## 2021-07-14 DIAGNOSIS — Z71.85 IMMUNIZATION COUNSELING: ICD-10-CM

## 2021-08-17 ENCOUNTER — TELEPHONE (OUTPATIENT)
Dept: OCCUPATIONAL MEDICINE | Facility: CLINIC | Age: 31
End: 2021-08-17

## 2021-08-17 NOTE — TELEPHONE ENCOUNTER
Phone Number Called: [phone]    Call outcome: Spoke to patient regarding message below.    Message:Patient stated she was tested for COVID on 8/12. Results were negative. States she is still having various symptoms(cough, diarrhea,runny nose, body aches).Was scheduled to be retested, I called IP and they recommended we do not retest. I informed the patient they are advising she see a PCP or urgent care as these symptoms may be related to an alternative diagnosis and they would be able to further test. Patient stated she was reaching out to her supervisor.

## 2021-10-18 NOTE — ED NOTES
How Severe Are Your Spot(S)?: mild Pt roomed from [facility]. Gait steady. Pt presents for symptoms as stated in the triage note. Pt reports very mild nausea but denies vomiting/diarrhea.    Have Your Spot(S) Been Treated In The Past?: has not been treated Hpi Title: Evaluation of a Skin Lesion

## (undated) DEVICE — DRAPE VAGINAL BIB W/ POUCH (10EA/CA)

## (undated) DEVICE — ELECTRODE DUAL RETURN W/ CORD - (50/PK)

## (undated) DEVICE — ELECTRODE 850 FOAM ADHESIVE - HYDROGEL RADIOTRNSPRNT (50/PK)

## (undated) DEVICE — SUTURE 0 VICRYL PLUS CT-1 - 8 X 18 INCH (12/BX)

## (undated) DEVICE — TRAY FOLEY CATHETER STATLOCK 16FR SURESTEP  (10EA/CA)

## (undated) DEVICE — PROTECTOR ULNA NERVE - (36PR/CA)

## (undated) DEVICE — MASK AIRWAY SIZE 3 UNIQUE SILICON (10/BX)

## (undated) DEVICE — SENSOR SPO2 NEO LNCS ADHESIVE (20/BX) SEE USER NOTES

## (undated) DEVICE — CATHETER IV 20 GA X 1-1/4 ---SURG.& SDS ONLY--- (50EA/BX)

## (undated) DEVICE — WATER IRRIGATION STERILE 1000ML (12EA/CA)

## (undated) DEVICE — BLADE SURGICAL #15 - (50/BX 3BX/CA)

## (undated) DEVICE — SUTURE GENERAL

## (undated) DEVICE — SET EXTENSION WITH 2 PORTS (48EA/CA) ***PART #2C8610 IS A SUBSTITUTE*****

## (undated) DEVICE — TUBE CONNECTING SUCTION - CLEAR PLASTIC STERILE 72 IN (50EA/CA)

## (undated) DEVICE — CANISTER SUCTION RIGID RED 1500CC (40EA/CA)

## (undated) DEVICE — BLADE SURGICAL #11 - (50/BX)

## (undated) DEVICE — HEAD HOLDER JUNIOR/ADULT

## (undated) DEVICE — SODIUM CHL IRRIGATION 0.9% 1000ML (12EA/CA)

## (undated) DEVICE — KIT ANESTHESIA W/CIRCUIT & 3/LT BAG W/FILTER (20EA/CA)

## (undated) DEVICE — MASK ANESTHESIA ADULT  - (100/CA)

## (undated) DEVICE — KIT  I.V. START (100EA/CA)

## (undated) DEVICE — TUBING CLEARLINK DUO-VENT - C-FLO (48EA/CA)

## (undated) DEVICE — SUCTION INSTRUMENT YANKAUER BULBOUS TIP W/O VENT (50EA/CA)

## (undated) DEVICE — GLOVE BIOGEL SZ 8 SURGICAL PF LTX - (50PR/BX 4BX/CA)

## (undated) DEVICE — SYRINGE 10 ML CONTROL LL (25EA/BX 4BX/CA)

## (undated) DEVICE — LACTATED RINGERS INJ 1000 ML - (14EA/CA 60CA/PF)

## (undated) DEVICE — NEPTUNE 4 PORT MANIFOLD - (20/PK)

## (undated) DEVICE — Device

## (undated) DEVICE — TRAY SRGPRP PVP IOD WT PRP - (20/CA)

## (undated) DEVICE — DEVICE SUTURE CAPTURING

## (undated) DEVICE — SET IRRIGATION CYSTOSCOPY TUBE L80 IN (20EA/CA)

## (undated) DEVICE — SET LEADWIRE 5 LEAD BEDSIDE DISPOSABLE ECG (1SET OF 5/EA)

## (undated) DEVICE — SUTURE 2-0 VICRYL PLUS CT-1 36 (36PK/BX)"

## (undated) DEVICE — CANISTER SUCTION 3000ML MECHANICAL FILTER AUTO SHUTOFF MEDI-VAC NONSTERILE LF DISP  (40EA/CA)

## (undated) DEVICE — PAD SANITARY 11IN MAXI IND WRAPPED  (12EA/PK 24PK/CA)